# Patient Record
Sex: FEMALE | Race: WHITE | NOT HISPANIC OR LATINO | Employment: OTHER
[De-identification: names, ages, dates, MRNs, and addresses within clinical notes are randomized per-mention and may not be internally consistent; named-entity substitution may affect disease eponyms.]

---

## 2017-06-17 ENCOUNTER — HEALTH MAINTENANCE LETTER (OUTPATIENT)
Age: 67
End: 2017-06-17

## 2017-06-18 ENCOUNTER — MYC MEDICAL ADVICE (OUTPATIENT)
Dept: INTERNAL MEDICINE | Facility: CLINIC | Age: 67
End: 2017-06-18

## 2017-06-21 DIAGNOSIS — I10 ESSENTIAL HYPERTENSION: ICD-10-CM

## 2017-06-21 DIAGNOSIS — E78.5 HYPERLIPIDEMIA WITH TARGET LDL LESS THAN 130: ICD-10-CM

## 2017-06-21 PROCEDURE — 80061 LIPID PANEL: CPT | Performed by: INTERNAL MEDICINE

## 2017-06-21 PROCEDURE — 80053 COMPREHEN METABOLIC PANEL: CPT | Performed by: INTERNAL MEDICINE

## 2017-06-21 PROCEDURE — 36415 COLL VENOUS BLD VENIPUNCTURE: CPT | Performed by: INTERNAL MEDICINE

## 2017-06-22 LAB
ALBUMIN SERPL-MCNC: 3.8 G/DL (ref 3.4–5)
ALP SERPL-CCNC: 74 U/L (ref 40–150)
ALT SERPL W P-5'-P-CCNC: 32 U/L (ref 0–50)
ANION GAP SERPL CALCULATED.3IONS-SCNC: 10 MMOL/L (ref 3–14)
AST SERPL W P-5'-P-CCNC: 21 U/L (ref 0–45)
BILIRUB SERPL-MCNC: 0.8 MG/DL (ref 0.2–1.3)
BUN SERPL-MCNC: 17 MG/DL (ref 7–30)
CALCIUM SERPL-MCNC: 9.2 MG/DL (ref 8.5–10.1)
CHLORIDE SERPL-SCNC: 105 MMOL/L (ref 94–109)
CHOLEST SERPL-MCNC: 203 MG/DL
CO2 SERPL-SCNC: 26 MMOL/L (ref 20–32)
CREAT SERPL-MCNC: 0.73 MG/DL (ref 0.52–1.04)
GFR SERPL CREATININE-BSD FRML MDRD: 80 ML/MIN/1.7M2
GLUCOSE SERPL-MCNC: 94 MG/DL (ref 70–99)
HDLC SERPL-MCNC: 102 MG/DL
LDLC SERPL CALC-MCNC: 88 MG/DL
NONHDLC SERPL-MCNC: 101 MG/DL
POTASSIUM SERPL-SCNC: 4 MMOL/L (ref 3.4–5.3)
PROT SERPL-MCNC: 6.9 G/DL (ref 6.8–8.8)
SODIUM SERPL-SCNC: 141 MMOL/L (ref 133–144)
TRIGL SERPL-MCNC: 66 MG/DL

## 2017-06-26 ENCOUNTER — OFFICE VISIT (OUTPATIENT)
Dept: INTERNAL MEDICINE | Facility: CLINIC | Age: 67
End: 2017-06-26
Payer: COMMERCIAL

## 2017-06-26 VITALS
OXYGEN SATURATION: 98 % | HEART RATE: 75 BPM | HEIGHT: 65 IN | TEMPERATURE: 98 F | DIASTOLIC BLOOD PRESSURE: 66 MMHG | SYSTOLIC BLOOD PRESSURE: 120 MMHG | WEIGHT: 134 LBS | BODY MASS INDEX: 22.33 KG/M2

## 2017-06-26 DIAGNOSIS — F41.1 ANXIETY STATE: ICD-10-CM

## 2017-06-26 DIAGNOSIS — R91.8 LUNG NODULES: ICD-10-CM

## 2017-06-26 DIAGNOSIS — Z00.00 MEDICARE ANNUAL WELLNESS VISIT, INITIAL: Primary | ICD-10-CM

## 2017-06-26 DIAGNOSIS — I10 ESSENTIAL HYPERTENSION: ICD-10-CM

## 2017-06-26 DIAGNOSIS — E78.5 HYPERLIPIDEMIA WITH TARGET LDL LESS THAN 130: ICD-10-CM

## 2017-06-26 PROCEDURE — 99397 PER PM REEVAL EST PAT 65+ YR: CPT | Performed by: INTERNAL MEDICINE

## 2017-06-26 RX ORDER — PRAVASTATIN SODIUM 40 MG
40 TABLET ORAL DAILY
Qty: 90 TABLET | Refills: 3 | Status: SHIPPED | OUTPATIENT
Start: 2017-06-26 | End: 2018-05-11

## 2017-06-26 RX ORDER — LOSARTAN POTASSIUM AND HYDROCHLOROTHIAZIDE 12.5; 1 MG/1; MG/1
TABLET ORAL
Qty: 45 TABLET | Refills: 3 | Status: SHIPPED | OUTPATIENT
Start: 2017-06-26 | End: 2018-05-11

## 2017-06-26 RX ORDER — FLUOXETINE 20 MG/1
20 TABLET, FILM COATED ORAL EVERY MORNING
Qty: 90 TABLET | Refills: 3 | Status: SHIPPED | OUTPATIENT
Start: 2017-06-26 | End: 2018-05-11

## 2017-06-26 ASSESSMENT — ANXIETY QUESTIONNAIRES
1. FEELING NERVOUS, ANXIOUS, OR ON EDGE: NOT AT ALL
7. FEELING AFRAID AS IF SOMETHING AWFUL MIGHT HAPPEN: NOT AT ALL
6. BECOMING EASILY ANNOYED OR IRRITABLE: NOT AT ALL
IF YOU CHECKED OFF ANY PROBLEMS ON THIS QUESTIONNAIRE, HOW DIFFICULT HAVE THESE PROBLEMS MADE IT FOR YOU TO DO YOUR WORK, TAKE CARE OF THINGS AT HOME, OR GET ALONG WITH OTHER PEOPLE: NOT DIFFICULT AT ALL
GAD7 TOTAL SCORE: 0
2. NOT BEING ABLE TO STOP OR CONTROL WORRYING: NOT AT ALL
5. BEING SO RESTLESS THAT IT IS HARD TO SIT STILL: NOT AT ALL
3. WORRYING TOO MUCH ABOUT DIFFERENT THINGS: NOT AT ALL

## 2017-06-26 ASSESSMENT — PATIENT HEALTH QUESTIONNAIRE - PHQ9: 5. POOR APPETITE OR OVEREATING: NOT AT ALL

## 2017-06-26 NOTE — LETTER
[facility]  [address] [address]  [phone]        July 2, 2018    Oliva Kearney  [address] [facility] [address]              Dear Oliva Kearney    This is to remind you that your fasting labs is due.    You may call our office at [phone] to schedule an appointment.    Please disregard this notice if you have already had your labs drawn or made an appointment.        Sincerely,        Mitch Traylor MD

## 2017-06-26 NOTE — MR AVS SNAPSHOT
After Visit Summary   6/26/2017    Oliva Kearney    MRN: [id number]           Patient Information     Date Of Birth          1950        Visit Information        Provider Department      6/26/2017 1:00 PM Mitch Traylor MD [facility]        Today's Diagnoses     Medicare annual wellness visit, initial    -  1    Hyperlipidemia with target LDL less than 130        Essential hypertension        Anxiety state        Lung nodules          Care Instructions      Preventive Health Recommendations    Female Ages 65 +    Yearly exam:     See your health care provider every year in order to  o Review health changes.   o Discuss preventive care.    o Review your medicines if your doctor has prescribed any.      You no longer need a yearly Pap test unless you've had an abnormal Pap test in the past 10 years. If you have vaginal symptoms, such as bleeding or discharge, be sure to talk with your provider about a Pap test.      Every 1 to 2 years, have a mammogram.  If you are over 69, talk with your health care provider about whether or not you want to continue having screening mammograms.      Every 10 years, have a colonoscopy. Or, have a yearly FIT test (stool test). These exams will check for colon cancer.       Have a cholesterol test every 5 years, or more often if your doctor advises it.       Have a diabetes test (fasting glucose) every three years. If you are at risk for diabetes, you should have this test more often.       At age 65, have a bone density scan (DEXA) to check for osteoporosis (brittle bone disease).    Shots:    Get a flu shot each year.    Get a tetanus shot every 10 years.    Talk to your doctor about your pneumonia vaccines. There are now two you should receive - Pneumovax (PPSV 23) and Prevnar (PCV 13).    Talk to your doctor about the shingles vaccine.    Talk to your doctor about the hepatitis B vaccine.    Nutrition:     Eat at least 5 servings of  fruits and vegetables each day.      Eat whole-grain bread, whole-wheat pasta and brown rice instead of white grains and rice.      Talk to your provider about Calcium and Vitamin D.     Lifestyle    Exercise at least 150 minutes a week (30 minutes a day, 5 days a week). This will help you control your weight and prevent disease.      Limit alcohol to one drink per day.      No smoking.       Wear sunscreen to prevent skin cancer.       See your dentist twice a year for an exam and cleaning.      See your eye doctor every 1 to 2 years to screen for conditions such as glaucoma, macular degeneration and cataracts.          Follow-ups after your visit        Who to contact     If you have questions or need follow up information about today's clinic visit or your schedule please contact [facility] directly at [phone].  Normal or non-critical lab and imaging results will be communicated to you by [employer], letter or phone within 4 business days after the clinic has received the results. If you do not hear from us within 7 days, please contact the clinic through [employer] or phone. If you have a critical or abnormal lab result, we will notify you by phone as soon as possible.  Submit refill requests through [employer] or call your pharmacy and they will forward the refill request to us. Please allow 3 business days for your refill to be completed.          Additional Information About Your Visit        [employer] Information     [employer] gives you secure access to your electronic health record. If you see a primary care provider, you can also send messages to your care team and make appointments. If you have questions, please call your primary care clinic.  If you do not have a primary care provider, please call [phone] and they will assist you.        Care EveryWhere ID     This is your Care EveryWhere ID. This could be used by other organizations to access your [facility]  "records  [id number]        Your Vitals Were     Pulse Temperature Height Pulse Oximetry BMI (Body Mass Index)       75 98  F (36.7  C) (Oral) 5' 5\" (1.651 m) 98% 22.3 kg/m2        Blood Pressure from Last 3 Encounters:   06/26/17 120/66   11/17/16 118/68   09/06/16 108/80    Weight from Last 3 Encounters:   06/26/17 134 lb (60.8 kg)   11/17/16 138 lb (62.6 kg)   09/06/16 138 lb (62.6 kg)                 Where to get your medicines      These medications were sent to [id number] - [place], [place] - [address] AT [facility] [address] [facility] [address]  [address] [facility] [place], [facility] [address]     Phone:  [phone]     FLUoxetine 20 MG tablet    losartan-hydrochlorothiazide 100-12.5 MG per tablet    pravastatin 40 MG tablet          Primary Care Provider Office Phone # Fax #    Mitch Traylor [phone]       [facility] [address] [facility] [address]        Equal Access to Services     CHEO PAK AH: Hadii aury ku hadasho Soomaali, waaxda luqadaha, qaybta kaalmada adeegyada, trung reddyin hayfanin felipe parker. So [facility] [place] [phone].    ATENCIÓN: Si habla español, tiene a hauser disposición servicios gratuitos de asistencia lingüística. Llame al [phone].    We comply with applicable federal civil rights laws and [place] laws. We do not discriminate on the basis of race, color, national origin, age, disability sex, sexual orientation or gender identity.            Thank you!     Thank you for choosing [facility]  for your care. Our goal is always to provide you with excellent care. Hearing back from our patients is one way we can continue to improve our services. Please take a few minutes to complete the written survey that you may receive in the mail after your visit with us. Thank you!             Your Updated Medication List - Protect others around you: Learn how to safely use, store and throw away your medicines at " www.disposemymeds.org.          This list is accurate as of: 6/26/17 11:59 PM.  Always use your most recent med list.                   Brand Name Dispense Instructions for use Diagnosis    FLUoxetine 20 MG tablet     90 tablet    Take 1 tablet (20 mg) by mouth every morning    Anxiety state       losartan-hydrochlorothiazide 100-12.5 MG per tablet    HYZAAR    45 tablet    1/2 tablet daily    Essential hypertension       pravastatin 40 MG tablet    PRAVACHOL    90 tablet    Take 1 tablet (40 mg) by mouth daily    Hyperlipidemia with target LDL less than 130

## 2017-06-26 NOTE — PROGRESS NOTES
SUBJECTIVE:                                                            Oliva Kearney is a 67 year old female who presents for Preventive Visit.      Are you in the first 12 months of your Medicare Part B coverage?  No    Healthy Habits:  Answers for HPI/ROS submitted by the patient on 6/24/2017   Annual Exam:  Getting at least 3 servings of Calcium per day:: Yes  Bi-annual eye exam:: Yes  Dental care twice a year:: Yes  Sleep apnea or symptoms of sleep apnea:: None  Diet:: Carbohydrate counting  Frequency of exercise:: 4-5 days/week  Taking medications regularly:: Yes  Medication side effects:: None  Additional concerns today:: No  PHQ-2 Score: 0  Duration of exercise:: 30-45 minutes      COGNITIVE SCREEN  1) Repeat 3 items (Banana, Sunrise, Chair)    2) Clock draw: NORMAL  3) 3 item recall: Recalls 3 objects  Results: NORMAL clock, 1-2 items recalled: COGNITIVE IMPAIRMENT LESS LIKELY    Mini-CogTM Copyright ARMEN Mckay. Licensed by the author for use in [facility]; reprinted with permission (soob@[email or web address]). All rights reserved.                Reviewed and updated as needed this visit by clinical staff  Tobacco  Allergies         Reviewed and updated as needed this visit by Provider        Social History   Substance Use Topics     Smoking status: Former Smoker     Packs/day: 0.25     Years: 10.00     Types: Cigarettes     Quit date: 10/26/1969     Smokeless tobacco: Not on file      Comment: light cigarette use     Alcohol use Yes      Comment: 1 glass wine per day max       The patient does not drink >3 drinks per day nor >7 drinks per week.    Today's PHQ-2 Score:   PHQ-2 ( 1999 Pfizer) 6/26/2017 6/24/2017   Q1: Little interest or pleasure in doing things 0 0   Q2: Feeling down, depressed or hopeless 0 0   PHQ-2 Score 0 0   Q1: Little interest or pleasure in doing things - Not at all   Q2: Feeling down, depressed or hopeless - Not at all   PHQ-2 Score - 0       Do you feel safe in your  environment - Yes    Do you have a Health Care Directive?: Yes: Patient states has Advance Directive and will bring in a copy to clinic.    Current providers sharing in care for this patient include:   Patient Care Team:  Mitch Traylor MD as PCP - General      Hearing impairment: No    Ability to successfully perform activities of daily living: Yes, no assistance needed     Fall risk:  Fallen 2 or more times in the past year?: No  Any fall with injury in the past year?: No    Home safety:  none identified      The following health maintenance items are reviewed in Epic and correct as of today:  Health Maintenance   Topic Date Due     PAP Q3 YR  12/10/2015     ADVANCE DIRECTIVE PLANNING Q5 YRS  12/05/2016     INFLUENZA VACCINE (SYSTEM ASSIGNED)  09/01/2017     FALL RISK ASSESSMENT  11/17/2017     MAMMO SCREEN Q2 YR (SYSTEM ASSIGNED)  12/19/2018     COLONOSCOPY Q5 YR  04/16/2019     LIPID SCREEN Q5 YR FEMALE (SYSTEM ASSIGNED)  06/21/2022     TETANUS IMMUNIZATION (SYSTEM ASSIGNED)  05/11/2026     DEXA SCAN SCREENING (SYSTEM ASSIGNED)  Completed     PNEUMOCOCCAL  Completed     HEPATITIS C SCREENING  Completed              ROS:  C: NEGATIVE for fever, chills. Weight down 4 pounds with diet/ activity  I: NEGATIVE for worrisome rashes, moles or lesions. Sees Dermatology annually with last exam 2 weeks ago  E: NEGATIVE for vision changes or irritation. Eye exam Fall 2016  E/M: NEGATIVE for ear, mouth and throat problems  R: NEGATIVE for significant cough or SOB. Hx tiny nodules that needs repeat CT in Sept 2017  B: NEGATIVE for masses, tenderness or discharge  CV: NEGATIVE for chest pain, palpitations or peripheral edema  GI: NEGATIVE for nausea, abdominal pain, heartburn, or change in bowel habits  : NEGATIVE for frequency, dysuria, or hematuria  M: NEGATIVE for significant arthralgias or myalgia that limits usual activities overall. Hx osteoarthritis   N: NEGATIVE for weakness, dizziness or paresthesias  E:  POSITIVE  "for  Lipid therapy with statin  H: NEGATIVE for bleeding problems  P: NEGATIVE for changes in mood or affect with med.MARCOS = 0    Problem list, Medication list, Allergies, and Medical/Social/Surgical histories reviewed in [facility] and updated as appropriate.  OBJECTIVE:                                                            /66  Pulse 75  Temp 98  F (36.7  C) (Oral)  Ht 5' 5\" (1.651 m)  Wt 134 lb (60.8 kg)  SpO2 98%  BMI 22.3 kg/m2 Estimated body mass index is 22.3 kg/(m^2) as calculated from the following:    Height as of this encounter: 5' 5\" (1.651 m).    Weight as of this encounter: 134 lb (60.8 kg).  EXAM:   General appearance - healthy, alert, no distress  Skin - No rashes or lesions.  Head - normocephalic, atraumatic  Eyes - MANDO, EOMI, fundi exam with nondilated pupils negative.  Ears - External ears normal. Canals clear. TM's normal.  Nose/Sinuses - Nares normal. Septum midline. Mucosa normal. No drainage or sinus tenderness.  Oropharynx - No erythema, no adenopathy, no exudates.  Neck - Supple without adenopathy or thyromegaly. No bruits.  Lungs - Clear to auscultation without wheezes/rhonchi.  Heart - Regular rate and rhythm without murmurs, clicks, or gallops.  Nodes - No supraclavicular, axillary, or inguinal adenopathy palpable.  Breasts - deferred  Abdomen - Abdomen soft, non-tender. BS normal. No masses or hepatosplenomegaly palpable. No bruits.  Extremities -No cyanosis, clubbing or edema.    Musculoskeletal - Spine ROM normal. Muscular strength intact.  DIP and PIP joints hands with osteoarthritis changes (chronic)  Peripheral pulses - radial=4/4, femoral=4/4, posterior tibial=4/4, dorsalis pedis=4/4,  Neuro - Gait normal. Reflexes normal and symmetric. Sensation grossly WNL.  Genital/Rectal - deferred      ASSESSMENT / PLAN:                                                            1. Medicare annual wellness visit, initial  HCM UTD    2. Hyperlipidemia with target LDL less than 130   " "controlled. Continue current medications. Future labs as ordered  - pravastatin (PRAVACHOL) 40 MG tablet; Take 1 tablet (40 mg) by mouth daily  Dispense: 90 tablet; Refill: 3  - Lipid panel reflex to direct LDL; Future  - Comprehensive metabolic panel; Future    3. Essential hypertension   controlled. Continue current medications. Future labs as ordered  - losartan-hydrochlorothiazide (HYZAAR) 100-12.5 MG per tablet; 1/2 tablet daily  Dispense: 45 tablet; Refill: 3  - Lipid panel reflex to direct LDL; Future  - Comprehensive metabolic panel; Future    4. Anxiety state   controlled. Continue current medications.   - FLUoxetine 20 MG tablet; Take 1 tablet (20 mg) by mouth every morning  Dispense: 90 tablet; Refill: 3    5. Lung nodules  Stable. Repeat CT chest in Septemner      End of Life Planning:  Patient currently has an advanced directive: Yes.  Practitioner is supportive of decision.    COUNSELING:  Reviewed preventive health counseling, as reflected in patient instructions        Estimated body mass index is 22.3 kg/(m^2) as calculated from the following:    Height as of this encounter: 5' 5\" (1.651 m).    Weight as of this encounter: 134 lb (60.8 kg).  Weight management plan noted, stable and monitoring   reports that she quit smoking about 47 years ago. Her smoking use included Cigarettes. She has a 2.50 pack-year smoking history. She does not have any smokeless tobacco history on file.      Appropriate preventive services were discussed with this patient, including applicable screening as appropriate for cardiovascular disease, diabetes, osteopenia/osteoporosis, and glaucoma.  As appropriate for age/gender, discussed screening for colorectal cancer, prostate cancer, breast cancer, and cervical cancer. Checklist reviewing preventive services available has been given to the patient.    Reviewed patients plan of care and provided an AVS. The Basic Care Plan (routine screening as documented in Health " Maintenance) for Oliva meets the Care Plan requirement. This Care Plan has been established and reviewed with the Patient.    Counseling Resources:  ATP IV Guidelines  Pooled Cohorts Equation Calculator  Breast Cancer Risk Calculator  FRAX Risk Assessment  ICSI Preventive Guidelines  Dietary Guidelines for Americans, 2010  USDA's MyPlate  ASA Prophylaxis  Lung CA Screening    PLAN:  Continue current meds  Prescriptions refilled.    Call  [phone] or use [employer] to schedule a future lab appointment  fasting in 1 year.   CT chest mid September       Mitch Traylor MD  [facility]

## 2017-06-26 NOTE — NURSING NOTE
"Chief Complaint   Patient presents with     Physical       Initial /66  Pulse 75  Temp 98  F (36.7  C) (Oral)  Ht 5' 5\" (1.651 m)  Wt 134 lb (60.8 kg)  SpO2 98%  BMI 22.3 kg/m2 Estimated body mass index is 22.3 kg/(m^2) as calculated from the following:    Height as of this encounter: 5' 5\" (1.651 m).    Weight as of this encounter: 134 lb (60.8 kg).  Medication Reconciliation: complete  "

## 2017-06-27 ASSESSMENT — ANXIETY QUESTIONNAIRES: GAD7 TOTAL SCORE: 0

## 2017-08-18 ENCOUNTER — MYC MEDICAL ADVICE (OUTPATIENT)
Dept: INTERNAL MEDICINE | Facility: CLINIC | Age: 67
End: 2017-08-18

## 2017-08-18 DIAGNOSIS — R91.8 LUNG NODULES: Primary | ICD-10-CM

## 2017-09-05 ENCOUNTER — HOSPITAL ENCOUNTER (OUTPATIENT)
Dept: CT IMAGING | Facility: CLINIC | Age: 67
Discharge: HOME | End: 2017-09-05
Attending: INTERNAL MEDICINE | Admitting: INTERNAL MEDICINE
Payer: MEDICARE

## 2017-09-05 DIAGNOSIS — R91.8 LUNG NODULES: ICD-10-CM

## 2017-09-05 PROCEDURE — 71250 CT THORAX DX C-: CPT

## 2018-01-02 ENCOUNTER — TRANSFERRED RECORDS (OUTPATIENT)
Dept: HEALTH INFORMATION MANAGEMENT | Facility: CLINIC | Age: 68
End: 2018-01-02

## 2018-01-29 ENCOUNTER — TRANSFERRED RECORDS (OUTPATIENT)
Dept: HEALTH INFORMATION MANAGEMENT | Facility: CLINIC | Age: 68
End: 2018-01-29

## 2018-03-22 ENCOUNTER — MYC MEDICAL ADVICE (OUTPATIENT)
Dept: INTERNAL MEDICINE | Facility: CLINIC | Age: 68
End: 2018-03-22

## 2018-03-23 ENCOUNTER — TELEPHONE (OUTPATIENT)
Dept: INTERNAL MEDICINE | Facility: CLINIC | Age: 68
End: 2018-03-23

## 2018-03-23 NOTE — TELEPHONE ENCOUNTER
Per your mychart conversation on 3- Jessie is requesting a referral to [facility] in [place] due to dizziness. The address is [address] [place] [address]. Phone# [phone] if questions and Fax #[phone].    Please place the order/referral and fax   Talked with Jessie and told her to check benefit level for out of state visit.

## 2018-03-23 NOTE — TELEPHONE ENCOUNTER
I cannot refer pt to a [facility] center as I have not seen her for any of these sx she discussed in [employer] message she sent and have no records and will not be available to review any records since not in clinic again until 4/2/18. Pt said in note that she was evaluated by a neurologist. Will have to get referral from neuro or be seen back  here in [place] for eval

## 2018-04-04 ENCOUNTER — TRANSFERRED RECORDS (OUTPATIENT)
Dept: HEALTH INFORMATION MANAGEMENT | Facility: CLINIC | Age: 68
End: 2018-04-04

## 2018-04-11 ENCOUNTER — TRANSFERRED RECORDS (OUTPATIENT)
Dept: HEALTH INFORMATION MANAGEMENT | Facility: CLINIC | Age: 68
End: 2018-04-11

## 2018-04-11 LAB
CHOLEST SERPL-MCNC: 213 MG/DL
CREAT SERPL-MCNC: 0.68 MG/DL (ref 0.5–0.99)
GFR SERPL CREATININE-BSD FRML MDRD: 90 ML/MIN/1.73M2
GLUCOSE SERPL-MCNC: 89 MG/DL (ref 65–99)
HDLC SERPL-MCNC: 100 MG/DL
POTASSIUM SERPL-SCNC: 4 MMOL/L (ref 3.5–5.3)
TRIGL SERPL-MCNC: 65 MG/DL
TSH SERPL-ACNC: 2.46 MIU/L (ref 0.4–4.5)

## 2018-05-11 ENCOUNTER — OFFICE VISIT (OUTPATIENT)
Dept: INTERNAL MEDICINE | Facility: CLINIC | Age: 68
End: 2018-05-11
Payer: COMMERCIAL

## 2018-05-11 VITALS
HEIGHT: 64 IN | TEMPERATURE: 98.3 F | OXYGEN SATURATION: 99 % | SYSTOLIC BLOOD PRESSURE: 144 MMHG | HEART RATE: 98 BPM | RESPIRATION RATE: 18 BRPM | WEIGHT: 134.3 LBS | BODY MASS INDEX: 22.93 KG/M2 | DIASTOLIC BLOOD PRESSURE: 76 MMHG

## 2018-05-11 DIAGNOSIS — F41.1 ANXIETY STATE: ICD-10-CM

## 2018-05-11 DIAGNOSIS — E78.5 HYPERLIPIDEMIA WITH TARGET LDL LESS THAN 130: ICD-10-CM

## 2018-05-11 DIAGNOSIS — E04.1 THYROID NODULE: ICD-10-CM

## 2018-05-11 DIAGNOSIS — I10 ESSENTIAL HYPERTENSION: ICD-10-CM

## 2018-05-11 DIAGNOSIS — R42 DIZZINESS: ICD-10-CM

## 2018-05-11 PROCEDURE — 99214 OFFICE O/P EST MOD 30 MIN: CPT | Performed by: INTERNAL MEDICINE

## 2018-05-11 NOTE — PROGRESS NOTES
"  SUBJECTIVE:   Oliva Kearney is a 67 year old female who presents to clinic today for the following health issues:      Dizziness      Duration: 4-5 months     Description   Feeling faint:  YES  Feeling like the surroundings are moving: no   Loss of consciousness or falls: no     Intensity:  moderate    Accompanying signs and symptoms:   Nausea/vomitting: YES- nausea  Palpitations: no   Weakness in arms or legs: no   Vision or speech changes: no   Ringing in ears (Tinnitus): no   Hearing loss related to dizziness: no   Other (fevers/chills/sweating/dyspnea): no     History (similar episodes/head trauma/previous evaluation/recent bleeding): None    Precipitating or alleviating factors (new meds/chemicals): None  Worse with activity/head movement: YES    Therapies tried and outcome: Resting, increased the Hyzaar to full tab-helped     Pt's past medical history, family history, habits, medications and allergies were reviewed with the patient today.  See snap shot for  HCM status. Most recent lab results reviewed with pt. Problem list and histories reviewed & adjusted, as indicated.  Additional history as below:    Pt sent [employer] message to me in March as below re: sx and work-up out of state:    Dr Traylor   I am currently out of state til May 1.  Before I left [place]- around [place]- I had an incident of being very off balance when first getting out of bed.  It went away but have been experiencing the same \"unbalance\" a few times a week & not going away sometimes for several hrs.  On 1/2 I went to a health center- there they did blood draw & CT head scan without contrast.  Saw no sign of stroke or brain problem- just \"age-related changes\"  & \"no acute intracranial abnormality\".  They rec & referred me to a neurologist whom I saw on 1/29,  He ordered a Bilateral Carotid Ultrasound- all in normal range (a note made of \"multiple cystic areas in the right lobe of thyroid gland compatible with goiter\")   I continue to " "experience dizziness (not the room-spinning \" type)- more of an unbalance.  I am assuming whatever the cause its not life-threatening & I made an apptmt to see you 5/7.  I am wondering though (as today symptoms continued for several hrs)- if you think there might be value in seeing an ear specialist as I've read that usually such problems are ear-related-   I realize that you can t diagnose from afar, have tried to be proactive, but have no answers at this point, so just wondering if you have any recommendations   Thank you   Lesley Kearney      Patient brings in some results of labs and imaging done in [place] as discussed above.   CT head 1/2/18 neg except for  \"age related changes\"   CUS 1/29/18 =  0-29% bilateral carotids. Had incidental multiple cystic areas right thyroid lobe  MRI brain: Moderate periventricular, subcortical white matter and pontine microangiopathic. No infarcts. No sinus issues   Noral TSH, lipids, CMP and lipids    No falls   Sx overall much better   Stress will occ bring it on.   No palpitations  Vision normal.   Mild anxiety recently with  having elevated LFTs from alcohol use (he is being seen in clinic today also at separate appt). Otherwise mood OK with meds. Denies depression      Additional ROS:   Constitutional, HEENT, Cardiovascular, Pulmonary, GI and , Neuro, MSK and Psych review of systems/symptoms are otherwise negative or unchanged from previous, except as noted above.      OBJECTIVE:  /76  Pulse 98  Temp 98.3  F (36.8  C) (Oral)  Resp 18  Ht 5' 4\" (1.626 m)  Wt 134 lb 4.8 oz (60.9 kg)  SpO2 99%  BMI 23.05 kg/m2   Estimated body mass index is 23.05 kg/(m^2) as calculated from the following:    Height as of this encounter: 5' 4\" (1.626 m).    Weight as of this encounter: 134 lb 4.8 oz (60.9 kg).  Eye: PERRL, EOMI  HENT: ear canals and TM's normal and nose and mouth without ulcers or lesions   Neck: no adenopathy. Thyroid nodule right.  No bruits  Pulm: " Lungs clear to auscultation   CV: Regular rates and rhythm. No orthostatic BP change with lying to standing  GI: Soft, nontender, Normal active bowel sounds, No hepatosplenomegaly or masses palpable  Ext: Peripheral pulses intact. No edema.  Neuro: Normal strength and tone, sensory exam grossly normal.  Negative Romberg.  Normal finger-nose-finger.  Normal heel to toe gait.  Negative Hallpike bilaterally    Assessment/Plan: (See plan discussion below for further details)  1. Dizziness  No orthostasis.  Appears related to BPPV that has resolved. See plan discussion below.     2. Essential hypertension  Blood pressure mildly elevated though some stress today discussing patient's 's health issues.  Continue current medication.  Given findings on MRI and cardiovascular risk factors, will add aspirin 81 mg daily.  Patient will update me in 1 month and Mychart regarding blood pressure status.  If still consistently greater than 130/80, will add amlodipine  - losartan-hydrochlorothiazide (HYZAAR) 100-12.5 MG per tablet; Take 1 tablet by mouth daily  Dispense: 90 tablet; Refill: 3  - aspirin 81 MG tablet; Take 1 tablet (81 mg) by mouth daily  Dispense: 30 tablet; Refill: 11    3. Anxiety state  Stable overall.  Recent mild stressors as above.  Continue fluoxetine at current dose  - FLUoxetine 20 MG tablet; Take 1 tablet (20 mg) by mouth every morning  Dispense: 90 tablet; Refill: 3    4. Hyperlipidemia with target LDL less than 130  Recent lipids done in [place] normal.  Results will be scanned in the chart.  Continue pravastatin  - pravastatin (PRAVACHOL) 40 MG tablet; Take 1 tablet (40 mg) by mouth daily  Dispense: 90 tablet; Refill: 3    5. Thyroid nodule  Thyroid ultrasound to follow-up on ? Cystic  changes seen on carotid ultrasound in [place]  -  Thyroid; Future      Plan discussion:   Aspirin 81mg daily   If develop any heart palp[itations, then let me know and will get Ziopatch 14 day monitor  If recurrence  dizziness, will refer to  Balance  Update me with BP 1 month if generally < 130/80 or do evisit/clinic visit if higher  Continue current medications.  Refills done  Thyroid ultrasound - [facility] will call to schedule       Mitch Traylor MD  Internal Medicine Department  [facility]

## 2018-05-11 NOTE — MR AVS SNAPSHOT
After Visit Summary   5/11/2018    Oliva Kearney    MRN: [id number]           Patient Information     Date Of Birth          1950        Visit Information        Provider Department      5/11/2018 9:30 AM Mitch Traylor MD [facility]        Today's Diagnoses     Essential hypertension        Anxiety state        Hyperlipidemia with target LDL less than 130          Care Instructions     Aspirin 81mg daily   If heart palpitations, then let me know and will get Ziopatch 14 day monitor  If recurrence dizziness, will refer to  Balance  Update me with BP 1 month if generally < 135/80 or do evisit/clinic visit if higher          Follow-ups after your visit        Your next 10 appointments already scheduled     Jun 29, 2018 10:00 AM CDT   PHYSICAL with Mitch Traylor MD   [facility] ([facility])    [address] [place] [address] [phone]              Who to contact     If you have questions or need follow up information about today's clinic visit or your schedule please contact [facility] directly at [phone].  Normal or non-critical lab and imaging results will be communicated to you by MyChart, letter or phone within 4 business days after the clinic has received the results. If you do not hear from us within 7 days, please contact the clinic through [employer] or phone. If you have a critical or abnormal lab result, we will notify you by phone as soon as possible.  Submit refill requests through [employer] or call your pharmacy and they will forward the refill request to us. Please allow 3 business days for your refill to be completed.          Additional Information About Your Visit        MyChart Information     [employer] gives you secure access to your electronic health record. If you see a primary care provider, you can also send messages to your care team and make  "appointments. If you have questions, please call your primary care clinic.  If you do not have a primary care provider, please call [phone] and they will assist you.        Care EveryWhere ID     This is your Care EveryWhere ID. This could be used by other organizations to access your [facility] medical records  [id number]        Your Vitals Were     Pulse Temperature Respirations Height Pulse Oximetry BMI (Body Mass Index)    98 98.3  F (36.8  C) (Oral) 18 5' 4\" (1.626 m) 99% 23.05 kg/m2       Blood Pressure from Last 3 Encounters:   05/11/18 144/76   06/26/17 120/66   11/17/16 118/68    Weight from Last 3 Encounters:   05/11/18 134 lb 4.8 oz (60.9 kg)   06/26/17 134 lb (60.8 kg)   11/17/16 138 lb (62.6 kg)              Today, you had the following     No orders found for display         Today's Medication Changes          These changes are accurate as of 5/11/18 10:26 AM.  If you have any questions, ask your nurse or doctor.               These medicines have changed or have updated prescriptions.        Dose/Directions    losartan-hydrochlorothiazide 100-12.5 MG per tablet   Commonly known as:  HYZAAR   This may have changed:    - how much to take  - additional instructions   Used for:  Essential hypertension        1/2 tablet daily   Quantity:  45 tablet   Refills:  3                Primary Care Provider Office Phone # Fax #    Mitch Traylor [phone]       [address] [facility] [address]        Equal Access to Services     CHRISTINAO PAK AH: Hadii aad ku hadasho Soomaali, waaxda luqadaha, qaybta kaalmada adeegyada, trung parker. So [facility] [phone].    ATENCIÓN: Si habla español, tiene a hauser disposición servicios gratuitos de asistencia lingüística. Llame al [phone].    We comply with applicable federal civil rights laws and [place] laws. We do not discriminate on the basis of race, color, national origin, age, disability, sex, sexual orientation, or " gender identity.            Thank you!     Thank you for choosing [facility]  for your care. Our goal is always to provide you with excellent care. Hearing back from our patients is one way we can continue to improve our services. Please take a few minutes to complete the written survey that you may receive in the mail after your visit with us. Thank you!             Your Updated Medication List - Protect others around you: Learn how to safely use, store and throw away your medicines at www.disposemymeds.org.          This list is accurate as of 5/11/18 10:26 AM.  Always use your most recent med list.                   Brand Name Dispense Instructions for use Diagnosis    FLUoxetine 20 MG tablet     90 tablet    Take 1 tablet (20 mg) by mouth every morning    Anxiety state       losartan-hydrochlorothiazide 100-12.5 MG per tablet    HYZAAR    45 tablet    1/2 tablet daily    Essential hypertension       pravastatin 40 MG tablet    PRAVACHOL    90 tablet    Take 1 tablet (40 mg) by mouth daily    Hyperlipidemia with target LDL less than 130

## 2018-05-11 NOTE — PATIENT INSTRUCTIONS
Aspirin 81mg daily   If develop any heart palp[itations, then let me know and will get Ziopatch 14 day monitor  If recurrence dizziness, will refer to [facility]  Update me with BP 1 month if generally < 130/80 or do evisit/clinic visit if higher  Continue current medications.  Refills done  Thyroid ultrasound - SD will call to schedule

## 2018-05-12 RX ORDER — PRAVASTATIN SODIUM 40 MG
40 TABLET ORAL DAILY
Qty: 90 TABLET | Refills: 3 | Status: SHIPPED | OUTPATIENT
Start: 2018-05-12 | End: 2019-01-28

## 2018-05-12 RX ORDER — LOSARTAN POTASSIUM AND HYDROCHLOROTHIAZIDE 12.5; 1 MG/1; MG/1
1 TABLET ORAL DAILY
Qty: 90 TABLET | Refills: 3 | Status: SHIPPED | OUTPATIENT
Start: 2018-05-12 | End: 2019-01-28

## 2018-05-12 RX ORDER — FLUOXETINE 20 MG/1
20 TABLET, FILM COATED ORAL EVERY MORNING
Qty: 90 TABLET | Refills: 3 | Status: SHIPPED | OUTPATIENT
Start: 2018-05-12 | End: 2019-01-28

## 2018-05-13 ENCOUNTER — MYC MEDICAL ADVICE (OUTPATIENT)
Dept: INTERNAL MEDICINE | Facility: CLINIC | Age: 68
End: 2018-05-13

## 2018-05-16 ENCOUNTER — TELEPHONE (OUTPATIENT)
Dept: INTERNAL MEDICINE | Facility: CLINIC | Age: 68
End: 2018-05-16

## 2018-05-16 ENCOUNTER — HOSPITAL ENCOUNTER (OUTPATIENT)
Dept: ULTRASOUND IMAGING | Facility: CLINIC | Age: 68
Discharge: HOME | End: 2018-05-16
Attending: INTERNAL MEDICINE | Admitting: INTERNAL MEDICINE
Payer: MEDICARE

## 2018-05-16 DIAGNOSIS — E04.1 THYROID NODULE: ICD-10-CM

## 2018-05-16 PROCEDURE — 76536 US EXAM OF HEAD AND NECK: CPT

## 2018-05-16 NOTE — TELEPHONE ENCOUNTER
Prior Authorization Retail Medication Request    Medication/Dose: fluoxetine 20mg tablets  ICD code (if different than what is on RX):  F41.1  Previously Tried and Failed:    Rationale:      Insurance Name:  [employer] [phone]  Insurance ID:  [id number]      Pharmacy Information (if different than what is on RX)  Name:  [facility] [address] [facility] [place] [address]  Phone:  [phone]

## 2018-05-17 NOTE — TELEPHONE ENCOUNTER
Central Prior Authorization Team   Phone: [phone]    PA Initiation    Medication: fluoxetine 20mg tablets  Insurance Company: [employer] - Phone [phone] Fax [phone]  Pharmacy Filling the Rx: [email or web address] [address] [place] [address] - [address] AT [facility] OF  [address]  Filling Pharmacy Phone: [phone]  Filling Pharmacy Fax:    Start Date: 5/17/2018

## 2018-05-17 NOTE — TELEPHONE ENCOUNTER
Prior Authorization Approval    Authorization Effective Date: 2/16/2018  Authorization Expiration Date: 5/17/2019  Medication: fluoxetine 20mg tablets  Approved Dose/Quantity:    Reference #:     Insurance Company: Massiel Castillo - Phone [phone] Fax [phone]  Expected CoPay:       CoPay Card Available:      Foundation Assistance Needed:    Which Pharmacy is filling the prescription (Not needed for infusion/clinic administered): [facility] [id number] [place] [address] [address] AT [address]  Pharmacy Notified: Yes  Patient Notified: Yes

## 2018-05-23 ENCOUNTER — MYC MEDICAL ADVICE (OUTPATIENT)
Dept: INTERNAL MEDICINE | Facility: CLINIC | Age: 68
End: 2018-05-23

## 2018-05-23 DIAGNOSIS — E04.2 MULTINODULAR GOITER: Primary | ICD-10-CM

## 2018-05-24 RX ORDER — LIDOCAINE 40 MG/G
CREAM TOPICAL
Status: CANCELLED | OUTPATIENT
Start: 2018-05-24

## 2018-05-25 ENCOUNTER — TELEPHONE (OUTPATIENT)
Dept: INTERVENTIONAL RADIOLOGY/VASCULAR | Facility: CLINIC | Age: 68
End: 2018-05-25

## 2018-05-25 NOTE — TELEPHONE ENCOUNTER
Interventional Radiology   Interventional Radiology at [facility] has been requested to perform a Right thyroid nodule (one vs 3) from Dr Traylor.  Oliva Kearney is a 67 year old woman with a PMH hyperlipidemia, hypertension, osteoarthritis, low back pain, recent dizzy spells while during work up had a carotid US done in [place] with an incidental finding of thyroid nodules on the right.  During work up she had a thyroid ultrasound done on 5/16/18. Results were:  IMPRESSION:   1. Multinodular goiter involving the right lobe versus one very large nodule with multiple cystic and solid components. If this is one nodule, it measures about 4.3 x 3.2 cm diameter.  2. Left lobe appears normal.    A thyroid FNA was requested for further evaluation.     Central scheduling has contacted the patient and scheduled the FNA for Tuesday 5/29/18.     [facility] Interventional Radiology CNP ([phone])

## 2018-05-29 ENCOUNTER — HOSPITAL ENCOUNTER (OUTPATIENT)
Facility: CLINIC | Age: 68
Discharge: HOME | End: 2018-05-29
Attending: INTERNAL MEDICINE | Admitting: INTERNAL MEDICINE
Payer: MEDICARE

## 2018-05-29 ENCOUNTER — HOSPITAL ENCOUNTER (OUTPATIENT)
Dept: ULTRASOUND IMAGING | Facility: CLINIC | Age: 68
End: 2018-05-29
Attending: INTERNAL MEDICINE | Admitting: INTERNAL MEDICINE
Payer: MEDICARE

## 2018-05-29 VITALS
TEMPERATURE: 98.4 F | DIASTOLIC BLOOD PRESSURE: 72 MMHG | RESPIRATION RATE: 16 BRPM | OXYGEN SATURATION: 98 % | SYSTOLIC BLOOD PRESSURE: 155 MMHG | HEART RATE: 56 BPM

## 2018-05-29 DIAGNOSIS — E04.2 MULTINODULAR GOITER: ICD-10-CM

## 2018-05-29 PROCEDURE — 88173 CYTOPATH EVAL FNA REPORT: CPT | Performed by: INTERNAL MEDICINE

## 2018-05-29 PROCEDURE — 76942 ECHO GUIDE FOR BIOPSY: CPT

## 2018-05-29 PROCEDURE — 88173 CYTOPATH EVAL FNA REPORT: CPT | Mod: 26 | Performed by: INTERNAL MEDICINE

## 2018-05-29 PROCEDURE — 40000863 ZZH STATISTIC RADIOLOGY XRAY, US, CT, MAR, NM

## 2018-05-29 NOTE — DISCHARGE INSTRUCTIONS
Thyroid Biopsy Discharge Instructions     After you go home:      You may resume your normal diet    Care of Puncture Site:      You may have mild bruising, soreness & swelling at the puncture site. This will go away in a few days    For swelling & bruising, you may use an ice pack on the site.     Activity:      You may go back to your normal activity    Avoid strenuous activity for 24 hours    Medicines:      You may resume all medications    For minor pain, you may take Acetaminophen (Tylenol) or Ibuprofen (Advil)            Call the provider who ordered this test if:      Increased redness or swelling at the site    Fluid or blood oozing from the site    Severe pain at the site    Chills or a fever greater than 101 F (38 C).    Any questions or concerns    Call  911 or go to the Emergency Room if:      Trouble breathing    Your neck swells    Bleeding that you cannot control    Severe difficulty swallowing    If you have questions call:      [facility] Radiology Dept @ [phone]    The provider who performed your procedure was _________________.

## 2018-05-29 NOTE — PROGRESS NOTES
RADIOLOGY PROCEDURE NOTE  Patient name: Oliva Kearney  MRN: [id number]  : 1950    Pre-procedure diagnosis: Thyroid nodule  Post-procedure diagnosis: Same    Procedure Date/Time: May 29, 2018  9:19 AM  Procedure: Right thyroid nodule biopsy  Estimated blood loss: None  Specimen(s) collected with description: 3 FNA samples onto slides  The patient tolerated the procedure well with no immediate complications.  Significant findings:none    See imaging dictation for procedural details.    Provider name: Reese Connors  Assistant(s):None

## 2018-05-29 NOTE — IP AVS SNAPSHOT
MRN:[id number]                      After Visit Summary   5/29/2018    Oliva Kearney    MRN: [id number]           Visit Information        Department      5/29/2018  8:10 AM [facility]          Review of your medicines      UNREVIEWED medicines. Ask your doctor about these medicines        Dose / Directions    aspirin 81 MG tablet   Used for:  Essential hypertension        Dose:  81 mg   Take 1 tablet (81 mg) by mouth daily   Quantity:  30 tablet   Refills:  11       FLUoxetine 20 MG tablet   Used for:  Anxiety state        Dose:  20 mg   Take 1 tablet (20 mg) by mouth every morning   Quantity:  90 tablet   Refills:  3       losartan-hydrochlorothiazide 100-12.5 MG per tablet   Commonly known as:  HYZAAR   Used for:  Essential hypertension        Dose:  1 tablet   Take 1 tablet by mouth daily   Quantity:  90 tablet   Refills:  3       pravastatin 40 MG tablet   Commonly known as:  PRAVACHOL   Used for:  Hyperlipidemia with target LDL less than 130        Dose:  40 mg   Take 1 tablet (40 mg) by mouth daily   Quantity:  90 tablet   Refills:  3                Protect others around you: Learn how to safely use, store and throw away your medicines at www.disposemymeds.org.         Follow-ups after your visit        Your next 10 appointments already scheduled     May 29, 2018  8:30 AM CDT   US BIOPSY THYROID FINE NEEDLE ASPIRATION with SHUS4, SH BODY RAD   [facility] ([facility])    [address] [place] [address] [phone]           Bring a list of your medicines to the exam. Include vitamins, minerals and over-the-counter drugs.  Tell your doctor in advance:   If you are or may be pregnant.   If you are taking Coumadin (or any other blood thinners) 5 days prior to the exam for any special instructions.  IF YOUR DOCTOR HAS TOLD YOU THAT YOU WILL BE RECEIVING SEDATION (medicine to help you relax): (Typically sedation is  "only for liver exams at [facility] and Renal Biopsy exams in Pediatrics)   See your family doctor for an exam within 30 days of treatment.   Plan for an adult to drive you home and stay with you for at least 24 hours.   No eating or drinking for 4 hours before your test. You may take medicine with small sips of water.   If you have diabetes:If you take insulin, call your diabetes care team for any special instructions for this exam.  Please call the Imaging Department at your exam site with any questions.             Jun 04, 2018  1:00 PM CDT   MA SCREEN IMPLANTS BILATERAL W/ DANNA with SHBCMA4   [facility] ([facility])    [address] [place] [address], [address]  [facility] [address] [phone]           Do not use any powder, lotion or deodorant under your arms or on your breast. If you do, we will ask you to remove it before your exam.  Wear comfortable, two-piece clothing.  If you have any allergies, tell your care team.  Bring any previous mammograms from other facilities or have them mailed to the breast center.  Three-dimensional (3D) mammograms are available at [facility] locations in [facility], [place], [place], [facility], [place], [place], and [place]. [facility] locations include [place] and [facility] in [place]. Benefits of 3D mammograms include: - Improved rate of cancer detection - Decreases your chance of having to go back for more tests, which means fewer: - \"False-positive\" results (This means that there is an abnormal area but it isn't cancer.) - Invasive testing procedures, such as a biopsy or surgery - Can provide clearer images of the breast if you have dense breast tissue. 3D mammography is an optional exam that anyone can have with a 2D mammogram. It doesn't replace or take the place of a 2D mammogram. 2D mammograms remain an effective screening test for all women.  Not all insurance companies cover the cost of " a 3D mammogram. Check with your insurance.            Jun 29, 2018 10:00 AM CDT   PHYSICAL with Mitch Traylor MD   [facility][1] ([facility]1])    [address] [place] [address] [phone]               Care Instructions        Further instructions from your care team       Thyroid Biopsy Discharge Instructions     After you go home:      You may resume your normal diet    Care of Puncture Site:      You may have mild bruising, soreness & swelling at the puncture site. This will go away in a few days    For swelling & bruising, you may use an ice pack on the site.     Activity:      You may go back to your normal activity    Avoid strenuous activity for 24 hours    Medicines:      You may resume all medications    For minor pain, you may take Acetaminophen (Tylenol) or Ibuprofen (Advil)            Call the provider who ordered this test if:      Increased redness or swelling at the site    Fluid or blood oozing from the site    Severe pain at the site    Chills or a fever greater than 101 F (38 C).    Any questions or concerns    Call  911 or go to the Emergency Room if:      Trouble breathing    Your neck swells    Bleeding that you cannot control    Severe difficulty swallowing    If you have questions call:      [facility] Radiology Dept @ [phone]    The provider who performed your procedure was _________________.     Additional Information About Your Visit        MyChart Information     [employer] gives you secure access to your electronic health record. If you see a primary care provider, you can also send messages to your care team and make appointments. If you have questions, please call your primary care clinic.  If you do not have a primary care provider, please call [phone] and they will assist you.        Care EveryWhere ID     This is your Care EveryWhere ID. This could be used by other organizations to access your [facility]  medical records  [id number]         Primary Care Provider Office Phone # Fax #    Mitch Traylor [phone]      Equal Access to Services     CHEO PAK : Mirlande morales taz Blanco, waaxda luqadaha, qaybta kaalmada manju, trung la oscarallison peres laTuckerbharat parker. So [facility] [phone].    ATENCIÓN: Si habla español, tiene a hauser disposición servicios gratuitos de asistencia lingüística. Llame al [phone].    We comply with applicable federal civil rights laws and [place] laws. We do not discriminate on the basis of race, color, national origin, age, disability, sex, sexual orientation, or gender identity.            Thank you!     Thank you for choosing [facility] for your care. Our goal is always to provide you with excellent care. Hearing back from our patients is one way we can continue to improve our services. Please take a few minutes to complete the written survey that you may receive in the mail after you visit with us. Thank you!             Medication List: This is a list of all your medications and when to take them. Check marks below indicate your daily home schedule. Keep this list as a reference.      Medications           Morning Afternoon Evening Bedtime As Needed    aspirin 81 MG tablet   Take 1 tablet (81 mg) by mouth daily                                FLUoxetine 20 MG tablet   Take 1 tablet (20 mg) by mouth every morning                                losartan-hydrochlorothiazide 100-12.5 MG per tablet   Commonly known as:  HYZAAR   Take 1 tablet by mouth daily                                pravastatin 40 MG tablet   Commonly known as:  PRAVACHOL   Take 1 tablet (40 mg) by mouth daily

## 2018-05-29 NOTE — PROGRESS NOTES
Here for Thyroid biopsy. Allergies noted, No fall risk. VSS. Briefly reviewed procedure, PA present and ready to do procedure.   0900 Post procedure assessment. VSS, afebrile. Right neck site CDI, no bleeding slight bruise at needle site, no hematoma. Denies pain. Reviewed d/c instructions with pt, states understanding. D/c per US staff.

## 2018-05-29 NOTE — IP AVS SNAPSHOT
[place] [address]    [place] [address]    Phone:  [phone]                                       After Visit Summary   5/29/2018    Oliva Kearney    MRN: [id number]           After Visit Summary Signature Page     I have received my discharge instructions, and my questions have been answered. I have discussed any challenges I see with this plan with the nurse or doctor.    ..........................................................................................................................................  Patient/Patient Representative Signature      ..........................................................................................................................................  Patient Representative Print Name and Relationship to Patient    ..................................................               ................................................  Date                                            Time    ..........................................................................................................................................  Reviewed by Signature/Title    ...................................................              ..............................................  Date                                                            Time

## 2018-05-30 LAB — COPATH REPORT: NORMAL

## 2018-06-04 ENCOUNTER — HOSPITAL ENCOUNTER (OUTPATIENT)
Dept: MAMMOGRAPHY | Facility: CLINIC | Age: 68
End: 2018-06-04
Attending: INTERNAL MEDICINE
Payer: MEDICARE

## 2018-06-04 DIAGNOSIS — Z12.31 VISIT FOR SCREENING MAMMOGRAM: ICD-10-CM

## 2018-06-04 PROCEDURE — 77067 SCR MAMMO BI INCL CAD: CPT

## 2018-07-02 ENCOUNTER — OFFICE VISIT (OUTPATIENT)
Dept: INTERNAL MEDICINE | Facility: CLINIC | Age: 68
End: 2018-07-02
Payer: COMMERCIAL

## 2018-07-02 VITALS
BODY MASS INDEX: 23.2 KG/M2 | DIASTOLIC BLOOD PRESSURE: 72 MMHG | HEART RATE: 73 BPM | WEIGHT: 135.9 LBS | HEIGHT: 64 IN | OXYGEN SATURATION: 98 % | SYSTOLIC BLOOD PRESSURE: 136 MMHG

## 2018-07-02 DIAGNOSIS — M85.80 OSTEOPENIA, UNSPECIFIED LOCATION: ICD-10-CM

## 2018-07-02 DIAGNOSIS — E78.5 HYPERLIPIDEMIA WITH TARGET LDL LESS THAN 130: ICD-10-CM

## 2018-07-02 DIAGNOSIS — E04.1 THYROID NODULE: ICD-10-CM

## 2018-07-02 DIAGNOSIS — I10 ESSENTIAL HYPERTENSION: ICD-10-CM

## 2018-07-02 DIAGNOSIS — F41.1 ANXIETY STATE: ICD-10-CM

## 2018-07-02 DIAGNOSIS — Z00.00 MEDICARE ANNUAL WELLNESS VISIT, SUBSEQUENT: Primary | ICD-10-CM

## 2018-07-02 PROCEDURE — 99397 PER PM REEVAL EST PAT 65+ YR: CPT | Performed by: INTERNAL MEDICINE

## 2018-07-02 ASSESSMENT — ACTIVITIES OF DAILY LIVING (ADL)
I_NEED_ASSISTANCE_FOR_THE_FOLLOWING_DAILY_ACTIVITIES:: NO ASSISTANCE IS NEEDED
CURRENT_FUNCTION: NO ASSISTANCE NEEDED

## 2018-07-02 NOTE — PROGRESS NOTES
SUBJECTIVE:   Oliva Kearney is a 68 year old female who presents for Preventive Visit.      Are you in the first 12 months of your Medicare coverage?  No    Physical   Annual:     Getting at least 3 servings of Calcium per day:  Yes    Bi-annual eye exam:  Yes    Dental care twice a year:  Yes    Sleep apnea or symptoms of sleep apnea:  None    Diet:  Regular (no restrictions)    Taking medications regularly:  Yes    Additional concerns today:  No    Ability to successfully perform activities of daily living: no assistance needed    Home Safety:  Lack of grab bars in the bathroom    Hearing Impairment: no hearing concerns      Ability to successfully perform activities of daily living: Yes, no assistance needed  Home safety:  none identified   Hearing impairment: No    Fall risk:  Fallen 2 or more times in the past year?: No  Any fall with injury in the past year?: No    COGNITIVE SCREEN  1) Repeat 3 items (Leader, Season, Table)    2) Clock draw: ABNORMAL Did not put all numbers on the clock  3) 3 item recall: Recalls 3 objects  Results: 3 items recalled: COGNITIVE IMPAIRMENT LESS LIKELY    Mini-CogTM Copyright ARMEN Mckay. Licensed by the author for use in [facility]; reprinted with permission (soob@[email or web address]). All rights reserved.        Reviewed and updated as needed this visit by clinical staff  Allergies  Meds         Reviewed and updated as needed this visit by Provider        Social History   Substance Use Topics     Smoking status: Former Smoker     Packs/day: 0.25     Years: 10.00     Types: Cigarettes     Quit date: 10/26/1969     Smokeless tobacco: Never Used      Comment: light cigarette use     Alcohol use Yes      Comment: 1 glass wine per day max       Alcohol Use 7/2/2018   If you drink alcohol do you typically have greater than 3 drinks per day OR greater than 7 drinks per week? No               Today's PHQ-2 Score:   PHQ-2 ( 1999 Pf[employer]) 7/2/2018   Q1: Little interest or pleasure  in doing things 0   Q2: Feeling down, depressed or hopeless 0   PHQ-2 Score 0   Q1: Little interest or pleasure in doing things Not at all   Q2: Feeling down, depressed or hopeless Not at all   PHQ-2 Score 0       Do you feel safe in your environment - Yes    Do you have a Health Care Directive?: Yes: Patient states has Advance Directive and will bring in a copy to clinic.    Current providers sharing in care for this patient include:   Patient Care Team:  Mitch Traylor MD as PCP - General    The following health maintenance items are reviewed in Epic and correct as of today:  Health Maintenance   Topic Date Due     ADVANCE DIRECTIVE PLANNING Q5 YRS  12/05/2016     FALL RISK ASSESSMENT  06/26/2018     INFLUENZA VACCINE (1) 09/01/2018     COLONOSCOPY Q5 YR  04/16/2019     DEXA Q3 YR  04/21/2019     PHQ-2 Q1 YR  05/11/2019     MAMMO SCREEN Q2 YR (SYSTEM ASSIGNED)  06/04/2020     LIPID SCREEN Q5 YR FEMALE (SYSTEM ASSIGNED)  04/11/2023     TETANUS IMMUNIZATION (SYSTEM ASSIGNED)  05/11/2026     PNEUMOCOCCAL  Completed     HEPATITIS C SCREENING  Completed     Labs reviewed in EPIC        Review of Systems  CONSTITUTIONAL: NEGATIVE for fever, chills, change in weight  INTEGUMENTARY/SKIN: NEGATIVE for worrisome rashes, moles or lesions  EYES: NEGATIVE for vision changes or irritation. Eye exam scheduled this Fall. Last 1 year ago  ENT/MOUTH: NEGATIVE for ear, mouth and throat problems. resolving mild nasal congestion  RESP: NEGATIVE for significant cough or SOB. Old lung nodules. Stable on CTs  BREAST: NEGATIVE for masses, tenderness or discharge  CV: NEGATIVE for chest pain, palpitations or peripheral edema  GI: NEGATIVE for nausea, abdominal pain, heartburn, or change in bowel habits  : NEGATIVE for frequency, dysuria, or hematuria. No vaginal sx  MUSCULOSKELETAL: NEGATIVE for significant arthralgias or myalgia that  Limits activity. Hx osteoarthritis   NEURO: NEGATIVE for weakness, dizziness or paresthesias  ENDOCRINE:  "NEGATIVE for temperature intolerance. Hx thyroid nodule with neg bx. Repeat U/S in 1 year  HEME: NEGATIVE for bleeding problems  PSYCHIATRIC: NEGATIVE for changes in mood or affect    OBJECTIVE:   /72  Pulse 73  Ht 5' 4\" (1.626 m)  Wt 135 lb 14.4 oz (61.6 kg)  SpO2 98%  BMI 23.33 kg/m2 Estimated body mass index is 23.05 kg/(m^2) as calculated from the following:    Height as of 5/11/18: 5' 4\" (1.626 m).    Weight as of 5/11/18: 134 lb 4.8 oz (60.9 kg).  Physical Exam  General appearance - healthy, alert, no distress  Skin - No rashes or lesions.  Head - normocephalic, atraumatic  Eyes - MANDO, EOMI, fundi exam with nondilated pupils negative.  Ears - External ears normal. Canals clear. TM's normal.  Nose/Sinuses - Nares normal. Septum midline. Mucosa normal. No drainage or sinus tenderness.  Oropharynx - No erythema, no adenopathy, no exudates.  Neck - Supple without adenopathy. No bruits.  Right thyroid nodule palpable and unchanged from previous exam  Lungs - Clear to auscultation without wheezes/rhonchi.  Heart - Regular rate and rhythm without murmurs, clicks, or gallops.  Nodes - No supraclavicular, axillary, or inguinal adenopathy palpable.  Breasts - deferred  Abdomen - Abdomen soft, non-tender. BS normal. No masses or hepatosplenomegaly palpable. No bruits.  Extremities -No cyanosis, clubbing or edema.    Musculoskeletal - Spine ROM normal. Muscular strength intact.   Peripheral pulses - radial=4/4, femoral=4/4, posterior tibial=4/4, dorsalis pedis=4/4,  Neuro - Gait normal. Reflexes normal and symmetric. Sensation grossly WNL.  Genital/Rectal - deferred          ASSESSMENT / PLAN:    1. Medicare annual wellness visit, subsequent  Mammogram, DEXA, colonoscopy, vaccinations up-to-date.  See discussion below regarding Shingrix    2. Essential hypertension  Controlled.  Continue current medication.  Repeat lab next spring  - Comprehensive metabolic panel; Future    3. Thyroid nodule  Benign biopsy " "May 2018.  Repeat ultrasound one year.  Future labs ordered  - TSH with free T4 reflex; Future    4. Hyperlipidemia with target LDL less than 130  Continue statin therapy.  Recent labs from April done in [place] reviewed.  Repeat labs in spring 2019  - Comprehensive metabolic panel; Future  - Lipid panel reflex to direct LDL Fasting; Future    5. Anxiety state  Controlled.  Continue fluoxetine. MARCOS = 0    6. Osteopenia, unspecified location  On drug holiday with previous Boniva treatment.  Repeat DEXA in 1 year        End of Life Planning:  Patient currently has an advanced directive: Yes.  Practitioner is supportive of decision.    COUNSELING:  Reviewed preventive health counseling, as reflected in patient instructions    BP Readings from Last 1 Encounters:   05/29/18 155/72     Estimated body mass index is 23.05 kg/(m^2) as calculated from the following:    Height as of 5/11/18: 5' 4\" (1.626 m).    Weight as of 5/11/18: 134 lb 4.8 oz (60.9 kg).           reports that she quit smoking about 48 years ago. Her smoking use included Cigarettes. She has a 2.50 pack-year smoking history. She has never used smokeless tobacco.      Appropriate preventive services were discussed with this patient, including applicable screening as appropriate for cardiovascular disease, diabetes, osteopenia/osteoporosis, and glaucoma.  As appropriate for age/gender, discussed screening for colorectal cancer, prostate cancer, breast cancer, and cervical cancer. Checklist reviewing preventive services available has been given to the patient.    Reviewed patients plan of care and provided an AVS. The Basic Care Plan (routine screening as documented in Health Maintenance) for Oliva meets the Care Plan requirement. This Care Plan has been established and reviewed with the Patient.    Counseling Resources:  ATP IV Guidelines  Pooled Cohorts Equation Calculator  Breast Cancer Risk Calculator  FRAX Risk Assessment  ICSI Preventive " Guidelines  Dietary Guidelines for Americans, 2010  USDA's MyPlate  ASA Prophylaxis  Lung CA Screening    Mitch Traylor MD  [facility]

## 2018-07-02 NOTE — MR AVS SNAPSHOT
After Visit Summary   7/2/2018    Oliva Kearney    MRN: [id number]           Patient Information     Date Of Birth          1950        Visit Information        Provider Department      7/2/2018 10:30 AM Mitch Traylor MD [facility]        Today's Diagnoses     Medicare annual wellness visit, subsequent    -  1    Essential hypertension        Thyroid nodule        Hyperlipidemia with target LDL less than 130        Anxiety state        Osteopenia, unspecified location          Care Instructions      Preventive Health Recommendations    Female Ages 65 +    Yearly exam:     See your health care provider every year in order to  o Review health changes.   o Discuss preventive care.    o Review your medicines if your doctor has prescribed any.      You no longer need a yearly Pap test unless you've had an abnormal Pap test in the past 10 years. If you have vaginal symptoms, such as bleeding or discharge, be sure to talk with your provider about a Pap test.      Every 1 to 2 years, have a mammogram.  If you are over 69, talk with your health care provider about whether or not you want to continue having screening mammograms.      Every 10 years, have a colonoscopy. Or, have a yearly FIT test (stool test). These exams will check for colon cancer.       Have a cholesterol test every 5 years, or more often if your doctor advises it.       Have a diabetes test (fasting glucose) every three years. If you are at risk for diabetes, you should have this test more often.       At age 65, have a bone density scan (DEXA) to check for osteoporosis (brittle bone disease).    Shots:    Get a flu shot each year.    Get a tetanus shot every 10 years.    Talk to your doctor about your pneumonia vaccines. There are now two you should receive - Pneumovax (PPSV 23) and Prevnar (PCV 13).    Talk to your pharmacist about the shingles vaccine.    Talk to your doctor about the hepatitis B  vaccine.    Nutrition:     Eat at least 5 servings of fruits and vegetables each day.      Eat whole-grain bread, whole-wheat pasta and brown rice instead of white grains and rice.      Get adequate Calcium and Vitamin D.     Lifestyle    Exercise at least 150 minutes a week (30 minutes a day, 5 days a week). This will help you control your weight and prevent disease.      Limit alcohol to one drink per day.      No smoking.       Wear sunscreen to prevent skin cancer.       See your dentist twice a year for an exam and cleaning.      See your eye doctor every 1 to 2 years to screen for conditions such as glaucoma, macular degeneration and cataracts.          Follow-ups after your visit        Who to contact     If you have questions or need follow up information about today's clinic visit or your schedule please contact [facility] directly at [phone].  Normal or non-critical lab and imaging results will be communicated to you by [employer], letter or phone within 4 business days after the clinic has received the results. If you do not hear from us within 7 days, please contact the clinic through [employer] or phone. If you have a critical or abnormal lab result, we will notify you by phone as soon as possible.  Submit refill requests through [employer] or call your pharmacy and they will forward the refill request to us. Please allow 3 business days for your refill to be completed.          Additional Information About Your Visit        [employer] Information     [employer] gives you secure access to your electronic health record. If you see a primary care provider, you can also send messages to your care team and make appointments. If you have questions, please call your primary care clinic.  If you do not have a primary care provider, please call [phone] and they will assist you.        Care EveryWhere ID     This is your Care EveryWhere ID. This could be used by other organizations to access  "your [facility] medical records  [id number]        Your Vitals Were     Pulse Height Pulse Oximetry BMI (Body Mass Index)          73 5' 4\" (1.626 m) 98% 23.33 kg/m2         Blood Pressure from Last 3 Encounters:   07/02/18 136/72   05/29/18 155/72   05/11/18 144/76    Weight from Last 3 Encounters:   07/02/18 135 lb 14.4 oz (61.6 kg)   05/11/18 134 lb 4.8 oz (60.9 kg)   06/26/17 134 lb (60.8 kg)               Primary Care Provider Office Phone # Fax #    Mitch Traylor [phone]       [address] [facility] [address]        Equal Access to Services     CHEO PAK : Mirlande meléndezo Soluz, waaxda luqadaha, qaybta kaalmada adeegyada, trung middleton . So [facility] [phone].    ATENCIÓN: Si habla español, tiene a hauser disposición servicios gratuitos de asistencia lingüística. Llame al [phone].    We comply with applicable federal civil rights laws and [place] laws. We do not discriminate on the basis of race, color, national origin, age, disability, sex, sexual orientation, or gender identity.            Thank you!     Thank you for choosing [facility]  for your care. Our goal is always to provide you with excellent care. Hearing back from our patients is one way we can continue to improve our services. Please take a few minutes to complete the written survey that you may receive in the mail after your visit with us. Thank you!             Your Updated Medication List - Protect others around you: Learn how to safely use, store and throw away your medicines at www.disposemymeds.org.          This list is accurate as of 7/2/18 11:59 PM.  Always use your most recent med list.                   Brand Name Dispense Instructions for use Diagnosis    aspirin 81 MG tablet     30 tablet    Take 1 tablet (81 mg) by mouth daily    Essential hypertension       FLUoxetine 20 MG tablet     90 tablet    Take 1 tablet (20 mg) by mouth every morning    " Anxiety state       losartan-hydrochlorothiazide 100-12.5 MG per tablet    HYZAAR    90 tablet    Take 1 tablet by mouth daily    Essential hypertension       pravastatin 40 MG tablet    PRAVACHOL    90 tablet    Take 1 tablet (40 mg) by mouth daily    Hyperlipidemia with target LDL less than 130

## 2018-07-07 PROBLEM — M85.80 OSTEOPENIA, UNSPECIFIED LOCATION: Status: ACTIVE | Noted: 2018-07-07

## 2018-07-07 ASSESSMENT — ANXIETY QUESTIONNAIRES
GAD7 TOTAL SCORE: 0
1. FEELING NERVOUS, ANXIOUS, OR ON EDGE: NOT AT ALL
2. NOT BEING ABLE TO STOP OR CONTROL WORRYING: NOT AT ALL
3. WORRYING TOO MUCH ABOUT DIFFERENT THINGS: NOT AT ALL
5. BEING SO RESTLESS THAT IT IS HARD TO SIT STILL: NOT AT ALL
IF YOU CHECKED OFF ANY PROBLEMS ON THIS QUESTIONNAIRE, HOW DIFFICULT HAVE THESE PROBLEMS MADE IT FOR YOU TO DO YOUR WORK, TAKE CARE OF THINGS AT HOME, OR GET ALONG WITH OTHER PEOPLE: NOT DIFFICULT AT ALL
7. FEELING AFRAID AS IF SOMETHING AWFUL MIGHT HAPPEN: NOT AT ALL
6. BECOMING EASILY ANNOYED OR IRRITABLE: NOT AT ALL

## 2018-07-07 ASSESSMENT — PATIENT HEALTH QUESTIONNAIRE - PHQ9: 5. POOR APPETITE OR OVEREATING: NOT AT ALL

## 2018-07-08 ASSESSMENT — ANXIETY QUESTIONNAIRES: GAD7 TOTAL SCORE: 0

## 2018-09-07 DIAGNOSIS — F41.1 ANXIETY STATE: ICD-10-CM

## 2018-09-07 NOTE — TELEPHONE ENCOUNTER
"Requested Prescriptions   Pending Prescriptions Disp Refills     FLUoxetine 20 MG tablet [Pharmacy Med Name: FLUOXETINE 20MG TABLETS] 90 tablet 0     Sig: TAKE 1 TABLET(20 MG) BY MOUTH EVERY MORNING    SSRIs Protocol Passed    9/7/2018 11:06 AM       Passed - Recent (12 mo) or future (30 days) visit within the authorizing provider's specialty    Patient had office visit in the last 12 months or has a visit in the next 30 days with authorizing provider or within the authorizing provider's specialty.  See \"Patient Info\" tab in inbasket, or \"Choose Columns\" in Meds & Orders section of the refill encounter.           Passed - Patient is age 18 or older       Passed - No active pregnancy on record       Passed - No positive pregnancy test in last 12 months        Last Written Prescription Date:  05/12/2018  Last Fill Quantity: 90,  # refills: 3   Last office visit: 7/2/2018 with prescribing provider:  Dr Traylor   Future Office Visit:        MARCOS-7 SCORE 4/25/2016 6/26/2017 7/7/2018   Total Score - - -   Total Score 0 0 0       PHQ-9 SCORE 10/28/2008 11/5/2009 12/3/2010   Total Score 0 0 0       "

## 2018-09-09 ENCOUNTER — MYC MEDICAL ADVICE (OUTPATIENT)
Dept: INTERNAL MEDICINE | Facility: CLINIC | Age: 68
End: 2018-09-09

## 2018-09-11 RX ORDER — FLUOXETINE 20 MG/1
TABLET, FILM COATED ORAL
Qty: 90 TABLET | Refills: 2 | Status: SHIPPED | OUTPATIENT
Start: 2018-09-11 | End: 2019-07-05

## 2019-01-26 ENCOUNTER — MYC MEDICAL ADVICE (OUTPATIENT)
Dept: INTERNAL MEDICINE | Facility: CLINIC | Age: 69
End: 2019-01-26

## 2019-01-26 DIAGNOSIS — E78.5 HYPERLIPIDEMIA WITH TARGET LDL LESS THAN 130: ICD-10-CM

## 2019-01-26 DIAGNOSIS — I10 ESSENTIAL HYPERTENSION: ICD-10-CM

## 2019-01-26 DIAGNOSIS — F41.1 ANXIETY STATE: ICD-10-CM

## 2019-01-28 RX ORDER — LOSARTAN POTASSIUM AND HYDROCHLOROTHIAZIDE 12.5; 1 MG/1; MG/1
1 TABLET ORAL DAILY
Qty: 90 TABLET | Refills: 0 | Status: SHIPPED | OUTPATIENT
Start: 2019-01-28 | End: 2019-04-04

## 2019-01-28 RX ORDER — FLUOXETINE 20 MG/1
20 TABLET, FILM COATED ORAL EVERY MORNING
Qty: 90 TABLET | Refills: 0 | Status: SHIPPED | OUTPATIENT
Start: 2019-01-28 | End: 2019-07-05

## 2019-01-28 RX ORDER — PRAVASTATIN SODIUM 40 MG
40 TABLET ORAL DAILY
Qty: 90 TABLET | Refills: 0 | Status: SHIPPED | OUTPATIENT
Start: 2019-01-28 | End: 2019-04-04

## 2019-02-05 ENCOUNTER — MYC MEDICAL ADVICE (OUTPATIENT)
Dept: INTERNAL MEDICINE | Facility: CLINIC | Age: 69
End: 2019-02-05

## 2019-02-05 DIAGNOSIS — F41.1 ANXIETY STATE: Primary | ICD-10-CM

## 2019-04-04 DIAGNOSIS — E78.5 HYPERLIPIDEMIA WITH TARGET LDL LESS THAN 130: ICD-10-CM

## 2019-04-04 DIAGNOSIS — I10 ESSENTIAL HYPERTENSION: ICD-10-CM

## 2019-04-04 RX ORDER — LOSARTAN POTASSIUM AND HYDROCHLOROTHIAZIDE 12.5; 1 MG/1; MG/1
TABLET ORAL
Qty: 90 TABLET | Refills: 0 | Status: SHIPPED | OUTPATIENT
Start: 2019-04-04 | End: 2019-07-05

## 2019-04-04 RX ORDER — PRAVASTATIN SODIUM 40 MG
TABLET ORAL
Qty: 90 TABLET | Refills: 0 | Status: SHIPPED | OUTPATIENT
Start: 2019-04-04 | End: 2019-07-05

## 2019-04-04 NOTE — TELEPHONE ENCOUNTER
"Requested Prescriptions   Pending Prescriptions Disp Refills     pravastatin (PRAVACHOL) 40 MG tablet [Pharmacy Med Name: PRAVASTATIN SODIUM 40 MG Tablet] 90 tablet 0     Sig: TAKE 1 TABLET EVERY DAY    Statins Protocol Failed - 4/4/2019 12:58 PM       Failed - LDL on file in past 12 months    Recent Labs   Lab Test 06/21/17  1041   LDL 88            Passed - No abnormal creatine kinase in past 12 months    Recent Labs   Lab Test 10/07/11  0949   CKT 98               Passed - Recent (12 mo) or future (30 days) visit within the authorizing provider's specialty    Patient had office visit in the last 12 months or has a visit in the next 30 days with authorizing provider or within the authorizing provider's specialty.  See \"Patient Info\" tab in inbasket, or \"Choose Columns\" in Meds & Orders section of the refill encounter.             Passed - Medication is active on med list       Passed - Patient is age 18 or older       Passed - No active pregnancy on record       Passed - No positive pregnancy test in past 12 months        losartan-hydrochlorothiazide (HYZAAR) 100-12.5 MG tablet [Pharmacy Med Name: LOSARTAN POTASSIUM/HYDROCHLOROTHIAZIDE 100-12.5 MG Tablet] 90 tablet 0     Sig: TAKE 1 TABLET EVERY DAY    Angiotensin-II Receptors Passed - 4/4/2019 12:58 PM       Passed - Blood pressure under 140/90 in past 12 months    BP Readings from Last 3 Encounters:   07/02/18 136/72   05/29/18 155/72   05/11/18 144/76                Passed - Recent (12 mo) or future (30 days) visit within the authorizing provider's specialty    Patient had office visit in the last 12 months or has a visit in the next 30 days with authorizing provider or within the authorizing provider's specialty.  See \"Patient Info\" tab in inbasket, or \"Choose Columns\" in Meds & Orders section of the refill encounter.             Passed - Medication is active on med list       Passed - Patient is age 18 or older       Passed - No active pregnancy on record    "    Passed - Normal serum creatinine on file in past 12 months    Recent Labs   Lab Test 04/11/18   CR 0.68            Passed - Normal serum potassium on file in past 12 months    Recent Labs   Lab Test 04/11/18   POTASSIUM 4.0                   Passed - No positive pregnancy test in past 12 months

## 2019-06-07 ENCOUNTER — HOSPITAL ENCOUNTER (OUTPATIENT)
Dept: MAMMOGRAPHY | Facility: CLINIC | Age: 69
Discharge: HOME | End: 2019-06-07
Attending: INTERNAL MEDICINE | Admitting: INTERNAL MEDICINE
Payer: MEDICARE

## 2019-06-07 DIAGNOSIS — Z12.31 VISIT FOR SCREENING MAMMOGRAM: ICD-10-CM

## 2019-06-07 PROCEDURE — 77067 SCR MAMMO BI INCL CAD: CPT

## 2019-07-01 ENCOUNTER — MYC MEDICAL ADVICE (OUTPATIENT)
Dept: INTERNAL MEDICINE | Facility: CLINIC | Age: 69
End: 2019-07-01

## 2019-07-01 NOTE — TELEPHONE ENCOUNTER
Few future orders already in place from previous with 5/1 expected date, anything else needed? Patient already has lab appt scheduled for 7/3.

## 2019-07-03 DIAGNOSIS — E04.1 THYROID NODULE: ICD-10-CM

## 2019-07-03 DIAGNOSIS — I10 ESSENTIAL HYPERTENSION: ICD-10-CM

## 2019-07-03 DIAGNOSIS — E78.5 HYPERLIPIDEMIA WITH TARGET LDL LESS THAN 130: ICD-10-CM

## 2019-07-03 LAB
ALBUMIN SERPL-MCNC: 3.8 G/DL (ref 3.4–5)
ALP SERPL-CCNC: 84 U/L (ref 40–150)
ALT SERPL W P-5'-P-CCNC: 28 U/L (ref 0–50)
ANION GAP SERPL CALCULATED.3IONS-SCNC: 8 MMOL/L (ref 3–14)
AST SERPL W P-5'-P-CCNC: 22 U/L (ref 0–45)
BILIRUB SERPL-MCNC: 0.4 MG/DL (ref 0.2–1.3)
BUN SERPL-MCNC: 18 MG/DL (ref 7–30)
CALCIUM SERPL-MCNC: 8.8 MG/DL (ref 8.5–10.1)
CHLORIDE SERPL-SCNC: 108 MMOL/L (ref 94–109)
CHOLEST SERPL-MCNC: 194 MG/DL
CO2 SERPL-SCNC: 26 MMOL/L (ref 20–32)
CREAT SERPL-MCNC: 0.69 MG/DL (ref 0.52–1.04)
GFR SERPL CREATININE-BSD FRML MDRD: 88 ML/MIN/{1.73_M2}
GLUCOSE SERPL-MCNC: 96 MG/DL (ref 70–99)
HDLC SERPL-MCNC: 92 MG/DL
LDLC SERPL CALC-MCNC: 92 MG/DL
NONHDLC SERPL-MCNC: 102 MG/DL
POTASSIUM SERPL-SCNC: 4.1 MMOL/L (ref 3.4–5.3)
PROT SERPL-MCNC: 6.9 G/DL (ref 6.8–8.8)
SODIUM SERPL-SCNC: 142 MMOL/L (ref 133–144)
TRIGL SERPL-MCNC: 48 MG/DL
TSH SERPL DL<=0.005 MIU/L-ACNC: 3.02 MU/L (ref 0.4–4)

## 2019-07-03 PROCEDURE — 80061 LIPID PANEL: CPT | Performed by: INTERNAL MEDICINE

## 2019-07-03 PROCEDURE — 36415 COLL VENOUS BLD VENIPUNCTURE: CPT | Performed by: INTERNAL MEDICINE

## 2019-07-03 PROCEDURE — 84443 ASSAY THYROID STIM HORMONE: CPT | Performed by: INTERNAL MEDICINE

## 2019-07-03 PROCEDURE — 80053 COMPREHEN METABOLIC PANEL: CPT | Performed by: INTERNAL MEDICINE

## 2019-07-05 ENCOUNTER — OFFICE VISIT (OUTPATIENT)
Dept: INTERNAL MEDICINE | Facility: CLINIC | Age: 69
End: 2019-07-05
Payer: MEDICARE

## 2019-07-05 VITALS
BODY MASS INDEX: 23.56 KG/M2 | WEIGHT: 138 LBS | RESPIRATION RATE: 16 BRPM | HEIGHT: 64 IN | SYSTOLIC BLOOD PRESSURE: 112 MMHG | DIASTOLIC BLOOD PRESSURE: 70 MMHG | TEMPERATURE: 98 F | OXYGEN SATURATION: 97 % | HEART RATE: 68 BPM

## 2019-07-05 DIAGNOSIS — Z00.00 MEDICARE ANNUAL WELLNESS VISIT, SUBSEQUENT: Primary | ICD-10-CM

## 2019-07-05 DIAGNOSIS — E04.1 THYROID NODULE: ICD-10-CM

## 2019-07-05 DIAGNOSIS — E78.5 HYPERLIPIDEMIA WITH TARGET LDL LESS THAN 130: ICD-10-CM

## 2019-07-05 DIAGNOSIS — Z78.0 ASYMPTOMATIC MENOPAUSAL STATE: ICD-10-CM

## 2019-07-05 DIAGNOSIS — Z12.11 SPECIAL SCREENING FOR MALIGNANT NEOPLASMS, COLON: ICD-10-CM

## 2019-07-05 DIAGNOSIS — F41.1 ANXIETY STATE: ICD-10-CM

## 2019-07-05 DIAGNOSIS — I10 ESSENTIAL HYPERTENSION: ICD-10-CM

## 2019-07-05 PROCEDURE — G0439 PPPS, SUBSEQ VISIT: HCPCS | Performed by: INTERNAL MEDICINE

## 2019-07-05 PROCEDURE — 99214 OFFICE O/P EST MOD 30 MIN: CPT | Mod: 25 | Performed by: INTERNAL MEDICINE

## 2019-07-05 RX ORDER — LOSARTAN POTASSIUM AND HYDROCHLOROTHIAZIDE 12.5; 1 MG/1; MG/1
TABLET ORAL
Qty: 45 TABLET | Refills: 3 | Status: SHIPPED | OUTPATIENT
Start: 2019-07-05 | End: 2020-08-21

## 2019-07-05 RX ORDER — PRAVASTATIN SODIUM 40 MG
TABLET ORAL
Qty: 90 TABLET | Refills: 3 | Status: SHIPPED | OUTPATIENT
Start: 2019-07-05 | End: 2020-06-22

## 2019-07-05 ASSESSMENT — PATIENT HEALTH QUESTIONNAIRE - PHQ9: 5. POOR APPETITE OR OVEREATING: NOT AT ALL

## 2019-07-05 ASSESSMENT — ANXIETY QUESTIONNAIRES
7. FEELING AFRAID AS IF SOMETHING AWFUL MIGHT HAPPEN: NOT AT ALL
GAD7 TOTAL SCORE: 5
1. FEELING NERVOUS, ANXIOUS, OR ON EDGE: NEARLY EVERY DAY
6. BECOMING EASILY ANNOYED OR IRRITABLE: NOT AT ALL
2. NOT BEING ABLE TO STOP OR CONTROL WORRYING: MORE THAN HALF THE DAYS
5. BEING SO RESTLESS THAT IT IS HARD TO SIT STILL: NOT AT ALL
3. WORRYING TOO MUCH ABOUT DIFFERENT THINGS: NOT AT ALL
IF YOU CHECKED OFF ANY PROBLEMS ON THIS QUESTIONNAIRE, HOW DIFFICULT HAVE THESE PROBLEMS MADE IT FOR YOU TO DO YOUR WORK, TAKE CARE OF THINGS AT HOME, OR GET ALONG WITH OTHER PEOPLE: NOT DIFFICULT AT ALL

## 2019-07-05 ASSESSMENT — ACTIVITIES OF DAILY LIVING (ADL): CURRENT_FUNCTION: NO ASSISTANCE NEEDED

## 2019-07-05 ASSESSMENT — MIFFLIN-ST. JEOR: SCORE: 1130.96

## 2019-07-05 NOTE — PATIENT INSTRUCTIONS
Continue current meds  Prescriptions refilled.    Call  [phone] or use [facility] to schedule a future lab appointment  fasting in 1 year.   Follow up with me a few days after these future labs are drawn to review results and other medical issues as necessary  Bone density test  [place]  Thyroid ultrasound - [place]   Colonoscopy  with  [facility]. They will call to schedule. If not heard from them in 1 week, then call [phone].   Pt was informed regarding extra E&M billing for management of established medical issues not related to wellness visit

## 2019-07-05 NOTE — PROGRESS NOTES
"SUBJECTIVE:   Oliva Kearney is a 69 year old female who presents for Preventive Visit and follow-up established medical issues including hypertension, anxiety, hyperlipidemia and thyroid nodules.      Are you in the first 12 months of your Medicare coverage?  No    Healthy Habits:     In general, how would you rate your overall health?  Excellent    Frequency of exercise:  4-5 days/week    Duration of exercise:  30-45 minutes    Do you usually eat at least 4 servings of fruit and vegetables a day, include whole grains    & fiber and avoid regularly eating high fat or \"junk\" foods?  Yes    Taking medications regularly:  No    Barriers to taking medications:  None    Medication side effects:  None    Ability to successfully perform activities of daily living:  No assistance needed    Home Safety:  No safety concerns identified    Hearing Impairment:  No hearing concerns    In the past 6 months, have you been bothered by leaking of urine? Yes (Once in a while )    In general, how would you rate your overall mental or emotional health?  Very good      PHQ-2 Total Score: 0    Additional concerns today:  No    Do you feel safe in your environment? Yes    Do you have a Health Care Directive? Yes: Patient states has Advance Directive and will bring in a copy to clinic.      Fall risk  Fallen 2 or more times in the past year?: No  Any fall with injury in the past year?: No    Cognitive Screening   1) Repeat 3 items (Leader, Season, Table)    2) Clock draw: NORMAL  3) 3 item recall: Recalls 3 objects  Results: 3 items recalled: COGNITIVE IMPAIRMENT LESS LIKELY    Mini-CogTM Copyright ARMEN Mckay. Licensed by the author for use in [facility]; reprinted with permission (martine@.[facility]). All rights reserved.      Do you have sleep apnea, excessive snoring or daytime drowsiness?: no    Reviewed and updated as needed this visit by clinical staff  Tobacco  Allergies  Meds         Reviewed and updated as needed " this visit by Provider        Social History     Tobacco Use     Smoking status: Former Smoker     Packs/day: 0.25     Years: 10.00     Pack years: 2.50     Types: Cigarettes     Last attempt to quit: 10/26/1969     Years since quittin.7     Smokeless tobacco: Never Used     Tobacco comment: light cigarette use   Substance Use Topics     Alcohol use: Yes     Comment: 1 glass wine per day max     If you drink alcohol do you typically have >3 drinks per day or >7 drinks per week? No      Alcohol Use 2019   Prescreen: >3 drinks/day or >7 drinks/week? -   Prescreen: >3 drinks/day or >7 drinks/week? Yes               Current providers sharing in care for this patient include:   Patient Care Team:  Mitch Traylor MD as PCP - General  Mitch Traylor MD as Assigned PCP    The following health maintenance items are reviewed in Epic and correct as of today:  Health Maintenance   Topic Date Due     ADVANCE CARE PLANNING  2016     PHQ-2  2019     COLONOSCOPY  2019     DEXA  2019     FALL RISK ASSESSMENT  2019     MEDICARE ANNUAL WELLNESS VISIT  2019     INFLUENZA VACCINE (1) 2019     MAMMO SCREENING  2021     LIPID  2024     DTAP/TDAP/TD IMMUNIZATION (3 - Td) 2026     HEPATITIS C SCREENING  Completed     PNEUMOCOCCAL IMMUNIZATION 65+ LOW/MEDIUM RISK  Completed     ZOSTER IMMUNIZATION  Completed     IPV IMMUNIZATION  Aged Out     MENINGITIS IMMUNIZATION  Aged Out     Labs reviewed in EPIC      Review of Systems  CONSTITUTIONAL: NEGATIVE for fever, chills. Weight up 2 pounds  INTEGUMENTARY/SKIN: NEGATIVE for worrisome rashes, moles or lesions  EYES: NEGATIVE for vision changes or irritation  ENT/MOUTH: NEGATIVE for ear, mouth and throat problems  RESP: NEGATIVE for significant cough or SOB  BREAST: NEGATIVE for masses, tenderness or discharge  CV: NEGATIVE for chest pain, palpitations or peripheral edema  GI: NEGATIVE for nausea, abdominal pain, heartburn, or  "change in bowel habits. Due for colonoscopy screening  : NEGATIVE for frequency, dysuria, or hematuria  MUSCULOSKELETAL: NEGATIVE for significant arthralgias or myalgia that stops activity. Has osteoarthritis in hands  NEURO: NEGATIVE for weakness, dizziness or paresthesias  ENDOCRINE: NEGATIVE for temperature intolerance, skin/hair changes  HEME: NEGATIVE for bleeding problems  PSYCHIATRIC:  Hx anxiety related to  ETOH use.  No abuse issues.  MARCOS = 5.  Patient states she is fine with the current dose of fluoxetine does not wish to change medication dosage and declines need for therapy.   has been through treatment for alcohol abuse and currently sober though has had intermittent episodes of \"falling off the wagon\"    OBJECTIVE:   /70   Pulse 68   Temp 98  F (36.7  C) (Oral)   Resp 16   Ht 1.626 m (5' 4\")   Wt 62.6 kg (138 lb)   SpO2 97%   BMI 23.69 kg/m   Estimated body mass index is 23.69 kg/m  as calculated from the following:    Height as of this encounter: 1.626 m (5' 4\").    Weight as of this encounter: 62.6 kg (138 lb).  Physical Exam  General appearance - healthy, alert, no distress  Skin - No rashes or lesions.  Head - normocephalic, atraumatic  Eyes - MANDO, EOMI, fundi exam with nondilated pupils negative.  Ears - External ears normal. Canals clear. TM's normal.  Nose/Sinuses - Nares normal. Septum midline. Mucosa normal. No drainage or sinus tenderness.  Oropharynx - No erythema, no adenopathy, no exudates.  Neck - Supple without adenopathy.  No bruits. Thyroid nodules palpable right side similar in size to previous exam  Lungs - Clear to auscultation without wheezes/rhonchi.  Heart - Regular rate and rhythm without murmurs, clicks, or gallops.  Nodes - No supraclavicular, axillary, or inguinal adenopathy palpable.  Breasts - deferred  Abdomen - Abdomen soft, non-tender. BS normal. No masses or hepatosplenomegaly palpable. No bruits.  Extremities -No cyanosis, clubbing or " edema.    Musculoskeletal - Spine ROM normal. Muscular strength intact.   Peripheral pulses - radial=4/4, femoral=4/4, posterior tibial=4/4, dorsalis pedis=4/4,  Neuro - Gait normal. Reflexes normal and symmetric. Sensation grossly WNL.  Genital/Rectal - deferred        ASSESSMENT / PLAN:   1. Medicare annual wellness visit, subsequent  Counseled regarding a flu vaccine this fall.  Recently had a mammogram.  Colonoscopy order placed as below.  Other health maintenance/vaccinations up-to-date.      2. Hyperlipidemia with target LDL less than 130  Controlled.  Continue current medication.  Repeat labs 1 year  - pravastatin (PRAVACHOL) 40 MG tablet; TAKE 1 TABLET EVERY DAY  Dispense: 90 tablet; Refill: 3  - Comprehensive metabolic panel; Future  - Lipid panel reflex to direct LDL Fasting; Future  - OFFICE/OUTPT VISIT,EST,LEVL IV    3. Essential hypertension  Controlled.  Continue current medication.  Repeat labs 1 year  - losartan-hydrochlorothiazide (HYZAAR) 100-12.5 MG tablet; TAKE 1/2 TABLET EVERY DAY  Dispense: 45 tablet; Refill: 3  - Comprehensive metabolic panel; Future  - OFFICE/OUTPT VISIT,EST,LEVL IV    4. Anxiety state   MARCOS mildly elevated.  Patient feels symptoms overall controlled with current medication dose declines need for counseling or medication adjustment.  Continue fluoxetine  - FLUoxetine (PROZAC) 20 MG capsule; Take 1 capsule (20 mg) by mouth daily  Dispense: 90 capsule; Refill: 3  - OFFICE/OUTPT VISIT,EST,LEVL IV    5. Thyroid nodule  Recent TSH normal.  Patient needs one-year follow-up ultrasound to ensure stability of nodules.  Denies current dysphagia/shortness of breath  - US Thyroid; Future  - OFFICE/OUTPT VISIT,EST,LEVL IV    6. Asymptomatic menopausal state   Previous biphosphonate therapy.  Currently on drug holiday.  Due for repeat DEXA  - DX Hip/Pelvis/Spine; Future    7. Special screening for malignant neoplasms, colon  Due for colonoscopy screening.  Denies any blood in the stools  -  "GASTROENTEROLOGY ADULT REF PROCEDURE ONLY Other; [facility] [phone]        End of Life Planning:  Patient currently has an advanced directive: Yes.  Practitioner is supportive of decision.    COUNSELING:  Reviewed preventive health counseling, as reflected in patient instructions    Estimated body mass index is 23.69 kg/m  as calculated from the following:    Height as of this encounter: 1.626 m (5' 4\").    Weight as of this encounter: 62.6 kg (138 lb).         reports that she quit smoking about 49 years ago. Her smoking use included cigarettes. She has a 2.50 pack-year smoking history. She has never used smokeless tobacco.      Appropriate preventive services were discussed with this patient, including applicable screening as appropriate for cardiovascular disease, diabetes, osteopenia/osteoporosis, and glaucoma.  As appropriate for age/gender, discussed screening for colorectal cancer, prostate cancer, breast cancer, and cervical cancer. Checklist reviewing preventive services available has been given to the patient.    Reviewed patients plan of care and provided an AVS. The Basic Care Plan (routine screening as documented in Health Maintenance) for Oliva meets the Care Plan requirement. This Care Plan has been established and reviewed with the Patient.    Counseling Resources:  ATP IV Guidelines  Pooled Cohorts Equation Calculator  Breast Cancer Risk Calculator  FRAX Risk Assessment  ICSI Preventive Guidelines  Dietary Guidelines for Americans, 2010  USDA's MyPlate  ASA Prophylaxis  Lung CA Screening      PLAN:  Continue current meds  Prescriptions refilled.    Call  [phone] or use [employer] to schedule a future lab appointment  fasting in 1 year.   Follow up with me a few days after these future labs are drawn to review results and other medical issues as necessary  Bone density test  [place]  Thyroid ultrasound - [place]   Colonoscopy  with  [facility]. They will call to schedule. If not heard " from them in 1 week, then call [phone].   Pt was informed regarding extra E&M billing for management of established medical issues not related to wellness visit        Mitch Traylor MD  [facility]

## 2019-07-06 ASSESSMENT — ANXIETY QUESTIONNAIRES: GAD7 TOTAL SCORE: 5

## 2019-07-29 ENCOUNTER — ANCILLARY PROCEDURE (OUTPATIENT)
Dept: BONE DENSITY | Facility: CLINIC | Age: 69
End: 2019-07-29
Attending: INTERNAL MEDICINE
Payer: MEDICARE

## 2019-07-29 ENCOUNTER — ANCILLARY PROCEDURE (OUTPATIENT)
Dept: ULTRASOUND IMAGING | Facility: CLINIC | Age: 69
End: 2019-07-29
Attending: INTERNAL MEDICINE
Payer: MEDICARE

## 2019-07-29 DIAGNOSIS — Z78.0 ASYMPTOMATIC MENOPAUSAL STATE: ICD-10-CM

## 2019-07-29 DIAGNOSIS — E04.1 THYROID NODULE: ICD-10-CM

## 2019-07-29 PROCEDURE — 77080 DXA BONE DENSITY AXIAL: CPT | Performed by: INTERNAL MEDICINE

## 2019-07-29 PROCEDURE — 76536 US EXAM OF HEAD AND NECK: CPT

## 2019-08-15 ENCOUNTER — MYC MEDICAL ADVICE (OUTPATIENT)
Dept: INTERNAL MEDICINE | Facility: CLINIC | Age: 69
End: 2019-08-15

## 2019-08-19 ENCOUNTER — TRANSFERRED RECORDS (OUTPATIENT)
Dept: HEALTH INFORMATION MANAGEMENT | Facility: CLINIC | Age: 69
End: 2019-08-19

## 2019-08-23 NOTE — TELEPHONE ENCOUNTER
Patient called requesting results for Thyroid US that was completed on 7/26/2019.    Please review and advise.     Radha HALL RN, BSN, PHN

## 2019-08-29 ENCOUNTER — MYC MEDICAL ADVICE (OUTPATIENT)
Dept: INTERNAL MEDICINE | Facility: CLINIC | Age: 69
End: 2019-08-29

## 2019-08-29 NOTE — TELEPHONE ENCOUNTER
PCP please see US Thyroid results, 7/29/2019 patient inquiring via MyChart.     Radha HALL RN, BSN, PHN

## 2019-08-30 NOTE — TELEPHONE ENCOUNTER
Spoke with pt. One nodule slightly larger. One smaller and one the same. Bx 1 year ago negative. Inform pt that I will  Speak with Endo next Wed (Dr Perkins) to discuss referring to him for opinion vs observation/repeat bx and will then update her further

## 2019-09-06 ENCOUNTER — DOCUMENTATION ONLY (OUTPATIENT)
Dept: OTHER | Facility: CLINIC | Age: 69
End: 2019-09-06

## 2019-09-08 ENCOUNTER — MYC MEDICAL ADVICE (OUTPATIENT)
Dept: INTERNAL MEDICINE | Facility: CLINIC | Age: 69
End: 2019-09-08

## 2019-09-08 DIAGNOSIS — E04.2 MULTIPLE THYROID NODULES: Primary | ICD-10-CM

## 2019-09-09 NOTE — TELEPHONE ENCOUNTER
PCP please advise if able to have consulted with Dr. Perkins regarding thyroid nodule.     Radha HALL RN, BSN, PHN

## 2019-09-13 PROBLEM — E04.2 MULTIPLE THYROID NODULES: Status: ACTIVE | Noted: 2019-09-13

## 2019-09-14 NOTE — TELEPHONE ENCOUNTER
Spoke with pt after discussion with Endo.  Bx from 1 year ago was to solid area of nodule #3 only. Both #1 and #3  Bigger on 2019 U/S thyroid  and both > 1.5cm. Will therefore have bx of solid nodule area for both #1 and #3. U/S guide bx order placed and told pt to call [phone] if  Has not heard from schedulers by early next week. Pt not taking any NSAIDS now

## 2019-09-18 ENCOUNTER — HOSPITAL ENCOUNTER (OUTPATIENT)
Facility: CLINIC | Age: 69
Discharge: HOME | End: 2019-09-18
Admitting: INTERNAL MEDICINE
Payer: MEDICARE

## 2019-09-18 ENCOUNTER — HOSPITAL ENCOUNTER (OUTPATIENT)
Dept: ULTRASOUND IMAGING | Facility: CLINIC | Age: 69
End: 2019-09-18
Attending: INTERNAL MEDICINE
Payer: MEDICARE

## 2019-09-18 VITALS
DIASTOLIC BLOOD PRESSURE: 74 MMHG | TEMPERATURE: 97.8 F | RESPIRATION RATE: 16 BRPM | OXYGEN SATURATION: 98 % | SYSTOLIC BLOOD PRESSURE: 135 MMHG

## 2019-09-18 DIAGNOSIS — E04.2 MULTIPLE THYROID NODULES: ICD-10-CM

## 2019-09-18 PROCEDURE — 25000125 ZZHC RX 250: Performed by: RADIOLOGY

## 2019-09-18 PROCEDURE — 88173 CYTOPATH EVAL FNA REPORT: CPT | Mod: 26,76

## 2019-09-18 PROCEDURE — 88173 CYTOPATH EVAL FNA REPORT: CPT

## 2019-09-18 PROCEDURE — 40000863 ZZH STATISTIC RADIOLOGY XRAY, US, CT, MAR, NM

## 2019-09-18 PROCEDURE — 10005 FNA BX W/US GDN 1ST LES: CPT

## 2019-09-18 RX ORDER — DEXTROSE MONOHYDRATE 25 G/50ML
25-50 INJECTION, SOLUTION INTRAVENOUS
Status: DISCONTINUED | OUTPATIENT
Start: 2019-09-18 | End: 2019-09-18 | Stop reason: HOSPADM

## 2019-09-18 RX ORDER — LIDOCAINE HYDROCHLORIDE 10 MG/ML
5 INJECTION, SOLUTION EPIDURAL; INFILTRATION; INTRACAUDAL; PERINEURAL ONCE
Status: COMPLETED | OUTPATIENT
Start: 2019-09-18 | End: 2019-09-18

## 2019-09-18 RX ORDER — NICOTINE POLACRILEX 4 MG
15-30 LOZENGE BUCCAL
Status: DISCONTINUED | OUTPATIENT
Start: 2019-09-18 | End: 2019-09-18 | Stop reason: HOSPADM

## 2019-09-18 RX ADMIN — LIDOCAINE HYDROCHLORIDE 5 ML: 10 INJECTION, SOLUTION EPIDURAL; INFILTRATION; INTRACAUDAL; PERINEURAL at 09:11

## 2019-09-18 NOTE — PROCEDURES
RADIOLOGY POST PROCEDURE NOTE    Patient name: Oliva Kearney  MRN: [id number]  : 1950    Pre-procedure diagnosis: right thyroid nodules.   Post-procedure diagnosis: Same    Procedure Date/Time: 2019  9:29 AM  Procedure: ultrasound guided thyroid nodule FNA>   Estimated blood loss: None  Specimen(s) collected with description: FNA samples.     The patient tolerated the procedure well with no immediate complications.  Significant findings:none    See imaging dictation for procedural details.    Provider name: Lizzy Boggs DO  Assistant(s):None

## 2019-09-18 NOTE — PROGRESS NOTES
Care Suites Admission Nursing Note    Reason for admission: thyroid FNA bx  CS arrival time: arrived to US dept for assessment at 0850  Accompanied by: none  Name/phone of DC : n/a  Medications held: n/a  Consent signed: with MD  Abnormal assessment/labs: n/a  If abnormal, provider notified: n/a  Education/questions answered: discharge instructions given pre proc, states understanding.  Plan: FNA bx

## 2019-09-18 NOTE — DISCHARGE INSTRUCTIONS
Thyroid/Lymph Node Biopsy Discharge Instructions     After you go home:      You may resume your normal diet    Care of Puncture Site:      You may have mild bruising, soreness & swelling at the puncture site. This will go away in a few days    For swelling & bruising, you may use an ice pack on the site.     Activity:      You may go back to your normal activity    Avoid strenuous activity for 24 hours    Medicines:      You may resume all medications    For minor pain, you may take Acetaminophen (Tylenol) or Ibuprofen (Advil)            Call the provider who ordered this test if:      Increased redness or swelling at the site    Fluid or blood oozing from the site    Severe pain at the site    Chills or a fever greater than 101 F (38 C).    Any questions or concerns    Call  911 or go to the Emergency Room if:      Trouble breathing    Your neck swells    Bleeding that you cannot control    Severe difficulty swallowing    If you have questions call:      [facility] Radiology Dept @ [phone]    The provider who performed your procedure was _________________.

## 2019-09-18 NOTE — PROGRESS NOTES
Care Suites Discharge Nursing Note    Education/questions answered: yes  Patient DC location: home  Accompanied by: none  CS discharge time: 0935    Band aid CDI to neck, no swelling.  No pain.  Pt had copy of discharge instr.  discharge to home in stable condition.

## 2019-09-20 ENCOUNTER — TELEPHONE (OUTPATIENT)
Dept: INTERNAL MEDICINE | Facility: CLINIC | Age: 69
End: 2019-09-20

## 2019-09-20 LAB — COPATH REPORT: NORMAL

## 2019-09-20 NOTE — TELEPHONE ENCOUNTER
Reason for Call:  Request for results:    Name of test or procedure: biopsy    Date of test of procedure: 9/18/19    Location of the test or procedure: [facility]    OK to leave the result message on voice mail or with a family member? YES    Phone number Patient can be reached at:  Cell number on file:    Telephone Information:   Mobile [phone]       Additional comments:     Call taken on 9/20/2019 at 3:54 PM by TALON WALSH

## 2019-10-01 ENCOUNTER — HEALTH MAINTENANCE LETTER (OUTPATIENT)
Age: 69
End: 2019-10-01

## 2020-06-22 ENCOUNTER — MYC REFILL (OUTPATIENT)
Dept: INTERNAL MEDICINE | Facility: CLINIC | Age: 70
End: 2020-06-22

## 2020-06-22 DIAGNOSIS — E78.5 HYPERLIPIDEMIA WITH TARGET LDL LESS THAN 130: ICD-10-CM

## 2020-06-23 RX ORDER — PRAVASTATIN SODIUM 40 MG
TABLET ORAL
Qty: 90 TABLET | Refills: 0 | Status: SHIPPED | OUTPATIENT
Start: 2020-06-23 | End: 2020-08-21

## 2020-07-01 ENCOUNTER — HOSPITAL ENCOUNTER (OUTPATIENT)
Dept: MAMMOGRAPHY | Facility: CLINIC | Age: 70
Discharge: HOME | End: 2020-07-01
Attending: INTERNAL MEDICINE | Admitting: INTERNAL MEDICINE
Payer: MEDICARE

## 2020-07-01 DIAGNOSIS — Z12.31 VISIT FOR SCREENING MAMMOGRAM: ICD-10-CM

## 2020-07-01 PROCEDURE — 77063 BREAST TOMOSYNTHESIS BI: CPT

## 2020-08-02 ENCOUNTER — MYC MEDICAL ADVICE (OUTPATIENT)
Dept: INTERNAL MEDICINE | Facility: CLINIC | Age: 70
End: 2020-08-02

## 2020-08-08 ENCOUNTER — MYC MEDICAL ADVICE (OUTPATIENT)
Dept: INTERNAL MEDICINE | Facility: CLINIC | Age: 70
End: 2020-08-08

## 2020-08-10 NOTE — TELEPHONE ENCOUNTER
MD would you be willing to order the labs for this pt ex:hepatic panel ? See my chart note .Leni Guzman RN

## 2020-08-15 NOTE — TELEPHONE ENCOUNTER
" This issues was regarding pt's christos Gonzales who has a separate med chart. See TE in his chart 8/10/20 re: issue that was addressed. This pt (pt's wife) educated by triage re: need to have requests re\": his care to come through his own chart and not hers  "

## 2020-08-21 ENCOUNTER — OFFICE VISIT (OUTPATIENT)
Dept: INTERNAL MEDICINE | Facility: CLINIC | Age: 70
End: 2020-08-21
Payer: MEDICARE

## 2020-08-21 VITALS
HEIGHT: 65 IN | TEMPERATURE: 98.4 F | WEIGHT: 140 LBS | SYSTOLIC BLOOD PRESSURE: 132 MMHG | DIASTOLIC BLOOD PRESSURE: 78 MMHG | OXYGEN SATURATION: 98 % | BODY MASS INDEX: 23.32 KG/M2 | RESPIRATION RATE: 16 BRPM | HEART RATE: 68 BPM

## 2020-08-21 DIAGNOSIS — E04.2 MULTIPLE THYROID NODULES: ICD-10-CM

## 2020-08-21 DIAGNOSIS — F41.1 ANXIETY STATE: ICD-10-CM

## 2020-08-21 DIAGNOSIS — E78.5 HYPERLIPIDEMIA WITH TARGET LDL LESS THAN 130: ICD-10-CM

## 2020-08-21 DIAGNOSIS — N39.3 FEMALE STRESS INCONTINENCE: ICD-10-CM

## 2020-08-21 DIAGNOSIS — M85.80 OSTEOPENIA, UNSPECIFIED LOCATION: ICD-10-CM

## 2020-08-21 DIAGNOSIS — Z00.00 MEDICARE ANNUAL WELLNESS VISIT, SUBSEQUENT: ICD-10-CM

## 2020-08-21 DIAGNOSIS — I10 ESSENTIAL HYPERTENSION: ICD-10-CM

## 2020-08-21 PROCEDURE — G0439 PPPS, SUBSEQ VISIT: HCPCS | Performed by: INTERNAL MEDICINE

## 2020-08-21 RX ORDER — LOSARTAN POTASSIUM AND HYDROCHLOROTHIAZIDE 12.5; 1 MG/1; MG/1
TABLET ORAL
Qty: 45 TABLET | Refills: 3 | Status: SHIPPED | OUTPATIENT
Start: 2020-08-21 | End: 2021-08-23

## 2020-08-21 RX ORDER — PRAVASTATIN SODIUM 40 MG
TABLET ORAL
Qty: 90 TABLET | Refills: 3 | Status: SHIPPED | OUTPATIENT
Start: 2020-08-21 | End: 2021-08-23

## 2020-08-21 ASSESSMENT — ANXIETY QUESTIONNAIRES
7. FEELING AFRAID AS IF SOMETHING AWFUL MIGHT HAPPEN: NOT AT ALL
1. FEELING NERVOUS, ANXIOUS, OR ON EDGE: SEVERAL DAYS
GAD7 TOTAL SCORE: 1
2. NOT BEING ABLE TO STOP OR CONTROL WORRYING: NOT AT ALL
IF YOU CHECKED OFF ANY PROBLEMS ON THIS QUESTIONNAIRE, HOW DIFFICULT HAVE THESE PROBLEMS MADE IT FOR YOU TO DO YOUR WORK, TAKE CARE OF THINGS AT HOME, OR GET ALONG WITH OTHER PEOPLE: NOT DIFFICULT AT ALL
3. WORRYING TOO MUCH ABOUT DIFFERENT THINGS: NOT AT ALL
6. BECOMING EASILY ANNOYED OR IRRITABLE: NOT AT ALL
5. BEING SO RESTLESS THAT IT IS HARD TO SIT STILL: NOT AT ALL

## 2020-08-21 ASSESSMENT — PATIENT HEALTH QUESTIONNAIRE - PHQ9: 5. POOR APPETITE OR OVEREATING: NOT AT ALL

## 2020-08-21 ASSESSMENT — MIFFLIN-ST. JEOR: SCORE: 1142.98

## 2020-08-21 ASSESSMENT — ACTIVITIES OF DAILY LIVING (ADL): CURRENT_FUNCTION: NO ASSISTANCE NEEDED

## 2020-08-21 NOTE — PROGRESS NOTES
"SUBJECTIVE:   Oliva Kearney is a 70 year old female who presents for Preventive Visit.  Are you in the first 12 months of your Medicare coverage?  No    Healthy Habits:     In general, how would you rate your overall health?  Excellent    Frequency of exercise:  6-7 days/week    Duration of exercise:  30-45 minutes    Do you usually eat at least 4 servings of fruit and vegetables a day, include whole grains    & fiber and avoid regularly eating high fat or \"junk\" foods?  Yes    Taking medications regularly:  Yes    Barriers to taking medications:  None    Medication side effects:  None    Ability to successfully perform activities of daily living:  No assistance needed    Home Safety:  No safety concerns identified    Hearing Impairment:  No hearing concerns    In the past 6 months, have you been bothered by leaking of urine? Yes (When coughing or sneezing)    In general, how would you rate your overall mental or emotional health?  Very good      PHQ-2 Total Score: 0    Additional concerns today:  Yes (Thyroid)    Do you feel safe in your environment? Yes    Have you ever done Advance Care Planning? (For example, a Health Directive, POLST, or a discussion with a medical provider or your loved ones about your wishes): Yes, advance care planning is on file.      Fall risk  Fallen 2 or more times in the past year?: No  Any fall with injury in the past year?: No    Cognitive Screening   1) Repeat 3 items (Leader, Season, Table)    2) Clock draw: NORMAL  3) 3 item recall: Recalls 3 objects  Results: NORMAL clock, 3 items recalled: COGNITIVE IMPAIRMENT LESS LIKELY    Mini-CogTM Copyright ARMEN Mckay. Licensed by the author for use in [facility]; reprinted with permission ([email or web address]). All rights reserved.      Do you have sleep apnea, excessive snoring or daytime drowsiness?: no    Reviewed and updated as needed this visit by clinical staff  Allergies         Reviewed and updated as needed this visit " by Provider        Social History     Tobacco Use     Smoking status: Former Smoker     Packs/day: 0.25     Years: 10.00     Pack years: 2.50     Types: Cigarettes     Last attempt to quit: 10/26/1969     Years since quittin.8     Smokeless tobacco: Never Used     Tobacco comment: light cigarette use   Substance Use Topics     Alcohol use: Yes     Comment: 2x week     If you drink alcohol do you typically have >3 drinks per day or >7 drinks per week? Yes    Alcohol Use 2019   Prescreen: >3 drinks/day or >7 drinks/week? -   Prescreen: >3 drinks/day or >7 drinks/week? Yes     AUDIT - Alcohol Use Disorders Identification Test - Reproduced from the World Health Organization Audit 2001 (Second Edition) 2020   1.  How often do you have a drink containing alcohol? 4 or more times a week   2.  How many drinks containing alcohol do you have on a typical day when you are drinking? 1 or 2   3.  How often do you have five or more drinks on one occasion? Never   4.  How often during the last year have you found that you were not able to stop drinking once you had started? Never   5.  How often during the last year have you failed to do what was normally expected of you because of drinking? Never   6.  How often during the last year have you needed a first drink in the morning to get yourself going after a heavy drinking session? Never   7.  How often during the last year have you had a feeling of guilt or remorse after drinking? Never   8.  How often during the last year have you been unable to remember what happened the night before because of your drinking? Never   9.  Have you or someone else been injured because of your drinking? No   10. Has a relative, friend, doctor or other health care worker been concerned about your drinking or suggested you cut down? No   TOTAL SCORE 4         Current providers sharing in care for this patient include:   Patient Care Team:  Mitch Traylor MD as PCP - General  Mitch Traylor  "MD JOSE as Assigned PCP    The following health maintenance items are reviewed in Epic and correct as of today:  Health Maintenance   Topic Date Due     PHQ-2  01/01/2020     MEDICARE ANNUAL WELLNESS VISIT  07/05/2020     FALL RISK ASSESSMENT  07/05/2020     INFLUENZA VACCINE (1) 09/01/2020     MAMMO SCREENING  07/01/2022     DEXA  07/29/2022     LIPID  07/03/2024     COLORECTAL CANCER SCREENING  08/19/2024     ADVANCE CARE PLANNING  09/06/2024     DTAP/TDAP/TD IMMUNIZATION (3 - Td) 05/11/2026     HEPATITIS C SCREENING  Completed     PNEUMOCOCCAL IMMUNIZATION 65+ LOW/MEDIUM RISK  Completed     ZOSTER IMMUNIZATION  Completed     IPV IMMUNIZATION  Aged Out     MENINGITIS IMMUNIZATION  Aged Out     HEPATITIS B IMMUNIZATION  Aged Out     Labs reviewed in EPIC      Review of Systems  CONSTITUTIONAL: NEGATIVE for fever, chills. Weight up 4 pounds in 2 years  INTEGUMENTARY/SKIN: NEGATIVE for worrisome rashes, moles or lesions  EYES: NEGATIVE for vision changes or irritation  ENT/MOUTH: NEGATIVE for ear, mouth and throat problems  RESP: NEGATIVE for significant cough or SOB  BREAST: NEGATIVE for masses, tenderness or discharge  CV: NEGATIVE for chest pain, palpitations or peripheral edema  GI: NEGATIVE for nausea, abdominal pain, heartburn, or change in bowel habits  : NEGATIVE for frequency, dysuria, or hematuria. Rare stress incontinence with severe sneeze. No urge issues   MUSCULOSKELETAL: NEGATIVE for significant arthralgias or myalgia except for arthritis in hands. Getting steroid injections in joints of hands. Followed by [facility]  NEURO: NEGATIVE for weakness, dizziness or paresthesias  ENDOCRINE: NEGATIVE for temperature intolerance. On statin therapy. Hx benign thyroid nodules  HEME: NEGATIVE for bleeding problems  PSYCHIATRIC: NEGATIVE for changes in mood or affect with meds. MARCOS  = 1    OBJECTIVE:   /78   Pulse 68   Temp 98.4  F (36.9  C) (Temporal)   Resp 16   Ht 1.638 m (5' 4.5\")   Wt 63.5 kg (140 lb)   " "SpO2 98%   BMI 23.66 kg/m   Estimated body mass index is 23.66 kg/m  as calculated from the following:    Height as of this encounter: 1.638 m (5' 4.5\").    Weight as of this encounter: 63.5 kg (140 lb).  Physical Exam  General appearance - healthy, alert, no distress  Skin - No rashes or lesions.  Head - normocephalic, atraumatic  Eyes - MANDO, EOMI, fundi exam with nondilated pupils negative.  Ears - External ears normal. Canals clear. TM's normal.  Nose/Sinuses - Nares normal. Septum midline. Mucosa normal. No drainage or sinus tenderness.  Oropharynx - No erythema, no adenopathy, no exudates.  Neck - Supple without adenopathy.  No bruits. Thyroid nodules palpable right side similar in size to previous exam  Lungs - Clear to auscultation without wheezes/rhonchi.  Heart - Regular rate and rhythm without murmurs, clicks, or gallops.  Nodes - No supraclavicular, axillary, or inguinal adenopathy palpable.  Breasts - deferred  Abdomen - Abdomen soft, non-tender. BS normal. No masses or hepatosplenomegaly palpable. No bruits.  Extremities -No cyanosis, clubbing or edema.    Musculoskeletal - Spine ROM normal. Muscular strength intact.  DIP and PIP joints hands with osteoarthritis node changes. No erythema/increased warmth  Peripheral pulses - radial=4/4, femoral=4/4, posterior tibial=4/4, dorsalis pedis=4/4,  Neuro - Gait normal. Reflexes normal and symmetric. Sensation grossly WNL.  Genital/Rectal - deferred        ASSESSMENT / PLAN:   1. Medicare annual wellness visit, subsequent  See below for healthcare maintenance discussion.  Otherwise up-to-date.    2. Anxiety state  Controlled.  Continue current medication.  - FLUoxetine (PROZAC) 20 MG capsule; Take 1 capsule (20 mg) by mouth daily  Dispense: 90 capsule; Refill: 3    3. Essential hypertension  Controlled.  Continue current medication. Has CMP lab ordered  - losartan-hydrochlorothiazide (HYZAAR) 100-12.5 MG tablet; TAKE 1/2 TABLET EVERY DAY  Dispense: 45 " "tablet; Refill: 3    4. Hyperlipidemia with target LDL less than 130  Previous control.  Continue current medication.  Fasting lipids in the next 1 to 2 weeks as previously ordered  - pravastatin (PRAVACHOL) 40 MG tablet; TAKE 1 TABLET EVERY DAY  Dispense: 90 tablet; Refill: 3    5. Osteopenia, unspecified location  DEXA July 2019. Repeat 3 years (previous [facility] treatment)    6. Multiple thyroid nodules  Thyroid nodule biopsies previously negative.  No symptoms.  Previous thyroid function normal.  Lab follow-up as ordered  - TSH with free T4 reflex; Future    7. Female stress incontinence  Symptoms minimal and not bothersome to the patient.  Will try doing daily Kegel exercises      COUNSELING:  Reviewed preventive health counseling, as reflected in patient instructions    Estimated body mass index is 23.69 kg/m  as calculated from the following:    Height as of 7/5/19: 1.626 m (5' 4\").    Weight as of 7/5/19: 62.6 kg (138 lb).         reports that she quit smoking about 50 years ago. Her smoking use included cigarettes. She has a 2.50 pack-year smoking history. She has never used smokeless tobacco.      Appropriate preventive services were discussed with this patient, including applicable screening as appropriate for cardiovascular disease, diabetes, osteopenia/osteoporosis, and glaucoma.  As appropriate for age/gender, discussed screening for colorectal cancer, prostate cancer, breast cancer, and cervical cancer. Checklist reviewing preventive services available has been given to the patient.    Reviewed patients plan of care and provided an AVS. The Basic Care Plan (routine screening as documented in Health Maintenance) for Oliva meets the Care Plan requirement. This Care Plan has been established and reviewed with the Patient.    Counseling Resources:  ATP IV Guidelines  Pooled Cohorts Equation Calculator  Breast Cancer Risk Calculator  FRAX Risk Assessment  ICSI Preventive Guidelines  Dietary Guidelines " for Americans, 2010  USDA's MyPlate  ASA Prophylaxis  Lung CA Screening      PLAN:  Continue current meds  Prescriptions refilled on file.    Call  [phone] or use [facility] to schedule a future lab appointment  fasting in 1-2 weeks.   For fasting labs, please refrain from eating for 8 hours or more.   Drink 2 glasses of water before your lab appointment. It is fine to take your  oral medications on the morning of the lab test as usual  I would recommend you receive an influenza (flu) vaccine  this Fall (September or October)  Kegel exercises for mild urinary stress incontinence symptoms  Pt was informed regarding extra E&M billing for management of new or established medical issues not related to today's wellness visit    Mitch Traylor MD  [facility]

## 2020-08-21 NOTE — PATIENT INSTRUCTIONS
Continue current meds  Prescriptions refilled on file.    Call  [phone] or use [facility] to schedule a future lab appointment  fasting in 1-2 weeks.   For fasting labs, please refrain from eating for 8 hours or more.   Drink 2 glasses of water before your lab appointment. It is fine to take your  oral medications on the morning of the lab test as usual  I would recommend you receive an influenza (flu) vaccine  this Fall (September or October)  Kegel exercises for mild urinary stress incontinence symptoms  Pt was informed regarding extra E&M billing for management of new or established medical issues not related to today's wellness visit

## 2020-08-22 ASSESSMENT — ANXIETY QUESTIONNAIRES: GAD7 TOTAL SCORE: 1

## 2020-08-23 PROBLEM — N39.3 FEMALE STRESS INCONTINENCE: Status: ACTIVE | Noted: 2020-08-23

## 2020-08-24 DIAGNOSIS — E78.5 HYPERLIPIDEMIA WITH TARGET LDL LESS THAN 130: ICD-10-CM

## 2020-08-24 DIAGNOSIS — I10 ESSENTIAL HYPERTENSION: ICD-10-CM

## 2020-08-24 DIAGNOSIS — E04.2 MULTIPLE THYROID NODULES: ICD-10-CM

## 2020-08-24 LAB
ALBUMIN SERPL-MCNC: 3.8 G/DL (ref 3.4–5)
ALP SERPL-CCNC: 67 U/L (ref 40–150)
ALT SERPL W P-5'-P-CCNC: 28 U/L (ref 0–50)
ANION GAP SERPL CALCULATED.3IONS-SCNC: 6 MMOL/L (ref 3–14)
AST SERPL W P-5'-P-CCNC: 21 U/L (ref 0–45)
BILIRUB SERPL-MCNC: 0.7 MG/DL (ref 0.2–1.3)
BUN SERPL-MCNC: 18 MG/DL (ref 7–30)
CALCIUM SERPL-MCNC: 9.2 MG/DL (ref 8.5–10.1)
CHLORIDE SERPL-SCNC: 104 MMOL/L (ref 94–109)
CHOLEST SERPL-MCNC: 210 MG/DL
CO2 SERPL-SCNC: 26 MMOL/L (ref 20–32)
CREAT SERPL-MCNC: 0.7 MG/DL (ref 0.52–1.04)
GFR SERPL CREATININE-BSD FRML MDRD: 86 ML/MIN/{1.73_M2}
GLUCOSE SERPL-MCNC: 89 MG/DL (ref 70–99)
HDLC SERPL-MCNC: 94 MG/DL
LDLC SERPL CALC-MCNC: 102 MG/DL
NONHDLC SERPL-MCNC: 116 MG/DL
POTASSIUM SERPL-SCNC: 4 MMOL/L (ref 3.4–5.3)
PROT SERPL-MCNC: 6.8 G/DL (ref 6.8–8.8)
SODIUM SERPL-SCNC: 136 MMOL/L (ref 133–144)
TRIGL SERPL-MCNC: 72 MG/DL
TSH SERPL DL<=0.005 MIU/L-ACNC: 3.29 MU/L (ref 0.4–4)

## 2020-08-24 PROCEDURE — 80053 COMPREHEN METABOLIC PANEL: CPT | Performed by: INTERNAL MEDICINE

## 2020-08-24 PROCEDURE — 80061 LIPID PANEL: CPT | Performed by: INTERNAL MEDICINE

## 2020-08-24 PROCEDURE — 84443 ASSAY THYROID STIM HORMONE: CPT | Performed by: INTERNAL MEDICINE

## 2020-08-24 PROCEDURE — 36415 COLL VENOUS BLD VENIPUNCTURE: CPT | Performed by: INTERNAL MEDICINE

## 2021-01-15 ENCOUNTER — HEALTH MAINTENANCE LETTER (OUTPATIENT)
Age: 71
End: 2021-01-15

## 2021-07-19 ENCOUNTER — TELEPHONE (OUTPATIENT)
Dept: INTERNAL MEDICINE | Facility: CLINIC | Age: 71
End: 2021-07-19

## 2021-07-19 DIAGNOSIS — I10 ESSENTIAL HYPERTENSION: ICD-10-CM

## 2021-07-19 DIAGNOSIS — E78.5 HYPERLIPIDEMIA WITH TARGET LDL LESS THAN 130: Primary | ICD-10-CM

## 2021-07-19 NOTE — TELEPHONE ENCOUNTER
Reason for Call: Request for an order or referral:    Order or referral being requested: Would like lab orders for blood work for physical     Date needed: 8/23/21    Has the patient been seen by the PCP for this problem? YES    Additional comments:     Phone number Patient can be reached at:  Home number on file [phone] (home)    Best Time:      Can we leave a detailed message on this number?  Not Applicable    Call taken on 7/19/2021 at 12:03 PM by Tong Garcia

## 2021-07-28 ENCOUNTER — HOSPITAL ENCOUNTER (OUTPATIENT)
Dept: MAMMOGRAPHY | Facility: CLINIC | Age: 71
Discharge: HOME | End: 2021-07-28
Attending: INTERNAL MEDICINE | Admitting: INTERNAL MEDICINE
Payer: COMMERCIAL

## 2021-07-28 DIAGNOSIS — Z12.31 VISIT FOR SCREENING MAMMOGRAM: ICD-10-CM

## 2021-07-28 PROCEDURE — 77067 SCR MAMMO BI INCL CAD: CPT

## 2021-08-16 ENCOUNTER — LAB (OUTPATIENT)
Dept: LAB | Facility: CLINIC | Age: 71
End: 2021-08-16
Payer: COMMERCIAL

## 2021-08-16 DIAGNOSIS — E78.5 HYPERLIPIDEMIA WITH TARGET LDL LESS THAN 130: ICD-10-CM

## 2021-08-16 DIAGNOSIS — I10 ESSENTIAL HYPERTENSION: ICD-10-CM

## 2021-08-16 LAB
ALBUMIN SERPL-MCNC: 3.7 G/DL (ref 3.4–5)
ALP SERPL-CCNC: 67 U/L (ref 40–150)
ALT SERPL W P-5'-P-CCNC: 29 U/L (ref 0–50)
ANION GAP SERPL CALCULATED.3IONS-SCNC: 3 MMOL/L (ref 3–14)
AST SERPL W P-5'-P-CCNC: 22 U/L (ref 0–45)
BILIRUB SERPL-MCNC: 0.9 MG/DL (ref 0.2–1.3)
BUN SERPL-MCNC: 16 MG/DL (ref 7–30)
CALCIUM SERPL-MCNC: 9.2 MG/DL (ref 8.5–10.1)
CHLORIDE BLD-SCNC: 104 MMOL/L (ref 94–109)
CHOLEST SERPL-MCNC: 213 MG/DL
CO2 SERPL-SCNC: 28 MMOL/L (ref 20–32)
CREAT SERPL-MCNC: 0.74 MG/DL (ref 0.52–1.04)
ERYTHROCYTE [DISTWIDTH] IN BLOOD BY AUTOMATED COUNT: 12.7 % (ref 10–15)
FASTING STATUS PATIENT QL REPORTED: YES
GFR SERPL CREATININE-BSD FRML MDRD: 81 ML/MIN/1.73M2
GLUCOSE BLD-MCNC: 90 MG/DL (ref 70–99)
HCT VFR BLD AUTO: 37.5 % (ref 35–47)
HDLC SERPL-MCNC: 94 MG/DL
HGB BLD-MCNC: 12.6 G/DL (ref 11.7–15.7)
LDLC SERPL CALC-MCNC: 102 MG/DL
MCH RBC QN AUTO: 33.2 PG (ref 26.5–33)
MCHC RBC AUTO-ENTMCNC: 33.6 G/DL (ref 31.5–36.5)
MCV RBC AUTO: 99 FL (ref 78–100)
NONHDLC SERPL-MCNC: 119 MG/DL
PLATELET # BLD AUTO: 211 10E3/UL (ref 150–450)
POTASSIUM BLD-SCNC: 3.7 MMOL/L (ref 3.4–5.3)
PROT SERPL-MCNC: 6.7 G/DL (ref 6.8–8.8)
RBC # BLD AUTO: 3.79 10E6/UL (ref 3.8–5.2)
SODIUM SERPL-SCNC: 135 MMOL/L (ref 133–144)
TRIGL SERPL-MCNC: 84 MG/DL
WBC # BLD AUTO: 5.3 10E3/UL (ref 4–11)

## 2021-08-16 PROCEDURE — 85027 COMPLETE CBC AUTOMATED: CPT

## 2021-08-16 PROCEDURE — 36415 COLL VENOUS BLD VENIPUNCTURE: CPT

## 2021-08-16 PROCEDURE — 80053 COMPREHEN METABOLIC PANEL: CPT

## 2021-08-16 PROCEDURE — 80061 LIPID PANEL: CPT

## 2021-08-18 ASSESSMENT — ENCOUNTER SYMPTOMS
ABDOMINAL PAIN: 0
ARTHRALGIAS: 1
COUGH: 0
DIZZINESS: 0
BREAST MASS: 0
PALPITATIONS: 0
NERVOUS/ANXIOUS: 0
CONSTIPATION: 0
HEARTBURN: 0
FREQUENCY: 0
PARESTHESIAS: 0
FEVER: 0
EYE PAIN: 0
CHILLS: 0
SHORTNESS OF BREATH: 0
WEAKNESS: 0
HEMATURIA: 0
HEMATOCHEZIA: 0
DYSURIA: 0
HEADACHES: 0
JOINT SWELLING: 1
NAUSEA: 0
MYALGIAS: 0
DIARRHEA: 0
SORE THROAT: 0

## 2021-08-18 ASSESSMENT — ACTIVITIES OF DAILY LIVING (ADL): CURRENT_FUNCTION: NO ASSISTANCE NEEDED

## 2021-08-23 ENCOUNTER — OFFICE VISIT (OUTPATIENT)
Dept: INTERNAL MEDICINE | Facility: CLINIC | Age: 71
End: 2021-08-23
Payer: COMMERCIAL

## 2021-08-23 VITALS
OXYGEN SATURATION: 94 % | WEIGHT: 131 LBS | SYSTOLIC BLOOD PRESSURE: 136 MMHG | RESPIRATION RATE: 16 BRPM | TEMPERATURE: 98.2 F | HEIGHT: 65 IN | DIASTOLIC BLOOD PRESSURE: 70 MMHG | HEART RATE: 66 BPM | BODY MASS INDEX: 21.83 KG/M2

## 2021-08-23 DIAGNOSIS — E04.2 MULTIPLE THYROID NODULES: ICD-10-CM

## 2021-08-23 DIAGNOSIS — B00.9 HERPES SIMPLEX INFECTION OF SKIN: ICD-10-CM

## 2021-08-23 DIAGNOSIS — E78.5 HYPERLIPIDEMIA WITH TARGET LDL LESS THAN 130: ICD-10-CM

## 2021-08-23 DIAGNOSIS — F41.1 ANXIETY STATE: ICD-10-CM

## 2021-08-23 DIAGNOSIS — I10 ESSENTIAL HYPERTENSION: ICD-10-CM

## 2021-08-23 DIAGNOSIS — Z00.00 MEDICARE ANNUAL WELLNESS VISIT, SUBSEQUENT: ICD-10-CM

## 2021-08-23 DIAGNOSIS — M19.91 PRIMARY OSTEOARTHRITIS, UNSPECIFIED SITE: ICD-10-CM

## 2021-08-23 DIAGNOSIS — M85.80 OSTEOPENIA, UNSPECIFIED LOCATION: ICD-10-CM

## 2021-08-23 PROCEDURE — 99397 PER PM REEVAL EST PAT 65+ YR: CPT | Performed by: INTERNAL MEDICINE

## 2021-08-23 PROCEDURE — 99214 OFFICE O/P EST MOD 30 MIN: CPT | Mod: 25 | Performed by: INTERNAL MEDICINE

## 2021-08-23 RX ORDER — LOSARTAN POTASSIUM AND HYDROCHLOROTHIAZIDE 12.5; 1 MG/1; MG/1
TABLET ORAL
Qty: 45 TABLET | Refills: 3 | Status: SHIPPED | OUTPATIENT
Start: 2021-08-23 | End: 2021-10-01

## 2021-08-23 RX ORDER — PRAVASTATIN SODIUM 40 MG
TABLET ORAL
Qty: 90 TABLET | Refills: 3 | Status: SHIPPED | OUTPATIENT
Start: 2021-08-23 | End: 2021-10-29

## 2021-08-23 RX ORDER — VALACYCLOVIR HYDROCHLORIDE 500 MG/1
500 TABLET, FILM COATED ORAL 2 TIMES DAILY
Qty: 30 TABLET | Refills: 0 | Status: SHIPPED | OUTPATIENT
Start: 2021-08-23 | End: 2023-07-18

## 2021-08-23 ASSESSMENT — ANXIETY QUESTIONNAIRES
1. FEELING NERVOUS, ANXIOUS, OR ON EDGE: NOT AT ALL
7. FEELING AFRAID AS IF SOMETHING AWFUL MIGHT HAPPEN: NOT AT ALL
GAD7 TOTAL SCORE: 0
IF YOU CHECKED OFF ANY PROBLEMS ON THIS QUESTIONNAIRE, HOW DIFFICULT HAVE THESE PROBLEMS MADE IT FOR YOU TO DO YOUR WORK, TAKE CARE OF THINGS AT HOME, OR GET ALONG WITH OTHER PEOPLE: NOT DIFFICULT AT ALL
2. NOT BEING ABLE TO STOP OR CONTROL WORRYING: NOT AT ALL
5. BEING SO RESTLESS THAT IT IS HARD TO SIT STILL: NOT AT ALL
6. BECOMING EASILY ANNOYED OR IRRITABLE: NOT AT ALL
3. WORRYING TOO MUCH ABOUT DIFFERENT THINGS: NOT AT ALL

## 2021-08-23 ASSESSMENT — ENCOUNTER SYMPTOMS
NERVOUS/ANXIOUS: 0
WEAKNESS: 0
CHILLS: 0
COUGH: 0
DIZZINESS: 0
ARTHRALGIAS: 1
EYE PAIN: 0
JOINT SWELLING: 1
BREAST MASS: 0
CONSTIPATION: 0
PALPITATIONS: 0
HEMATOCHEZIA: 0
FREQUENCY: 0
HEMATURIA: 0
HEADACHES: 0
NAUSEA: 0
MYALGIAS: 0
PARESTHESIAS: 0
ABDOMINAL PAIN: 0
HEARTBURN: 0
DYSURIA: 0
SHORTNESS OF BREATH: 0
SORE THROAT: 0
FEVER: 0
DIARRHEA: 0

## 2021-08-23 ASSESSMENT — ACTIVITIES OF DAILY LIVING (ADL): CURRENT_FUNCTION: NO ASSISTANCE NEEDED

## 2021-08-23 ASSESSMENT — MIFFLIN-ST. JEOR: SCORE: 1097.15

## 2021-08-23 ASSESSMENT — PATIENT HEALTH QUESTIONNAIRE - PHQ9: 5. POOR APPETITE OR OVEREATING: NOT AT ALL

## 2021-08-23 NOTE — PATIENT INSTRUCTIONS
Continue current meds  Prescriptions refilled.    Continue diet/exercise  Call  [phone] or use [facility] to schedule a future lab appointment  fasting in 1 year.   For fasting labs, please refrain from eating for 8 hours or more.   Drink 2 glasses of water before your lab appointment. It is fine to take your  oral medications on the morning of the lab test as usual  Schedule a follow up appointment with me in clinic a few days after these future labs are drawn to review results and other medical issues as necessary  Valcyclovir 500mg tab, 1 tab twice a day for 3 days per flare of painful skin  Rash in buttock cream area  Bone density (DEXA) in 1 year. This will be done at the [facility]. Call [phone] or use [facility] to schedule.   Thyroid ultrasound in 1 year. Will arrange at follow-up appt  Check records to see if imaging of the abdomina aorta done before age 64  I would recommend you receive an influenza (flu) vaccine  this Fall (October or early November)  Probable covid vaccine booster shot 8 months after your previous vaccine series. Will await further guidance from the CDC in September  Pt was informed regarding extra E&M billing for management of new or established medical issues not related to today's wellness visit

## 2021-08-23 NOTE — PROGRESS NOTES
"SUBJECTIVE:   Oliva Kearney is a 71 year old female who presents for Preventive Visit.      Patient has been advised of split billing requirements and indicates understanding: Yes   Are you in the first 12 months of your Medicare coverage?  No    Healthy Habits:     In general, how would you rate your overall health?  Excellent    Frequency of exercise:  4-5 days/week    Duration of exercise:  30-45 minutes    Do you usually eat at least 4 servings of fruit and vegetables a day, include whole grains    & fiber and avoid regularly eating high fat or \"junk\" foods?  Yes    Taking medications regularly:  Yes    Medication side effects:  Not applicable    Ability to successfully perform activities of daily living:  No assistance needed    Home Safety:  Lack of grab bars in the bathroom    Hearing Impairment:  No hearing concerns    In the past 6 months, have you been bothered by leaking of urine? Yes    In general, how would you rate your overall mental or emotional health?  Excellent      PHQ-2 Total Score: 0    Additional concerns today:  No    Do you feel safe in your environment? Yes    Have you ever done Advance Care Planning? (For example, a Health Directive, POLST, or a discussion with a medical provider or your loved ones about your wishes): Yes, advance care planning is on file.       Fall risk   No falls within the last year and no fall with injury    Cognitive Screening   1) Repeat 3 items (Leader, Season, Table)    2) Clock draw: NORMAL  3) 3 item recall: Recalls 3 objects  Results: NORMAL clock, 3 items recalled: COGNITIVE IMPAIRMENT LESS LIKELY    Mini-CogTM Copyright ARMEN Mckay. Licensed by the author for use in [facility]; reprinted with permission ([email or web address]). All rights reserved.      Do you have sleep apnea, excessive snoring or daytime drowsiness?: no    Reviewed and updated as needed this visit by clinical staff                 Reviewed and updated as needed this visit by " Provider                Social History     Tobacco Use     Smoking status: Former Smoker     Packs/day: 0.25     Years: 10.00     Pack years: 2.50     Types: Cigarettes     Quit date: 10/26/1969     Years since quittin.8     Smokeless tobacco: Never Used     Tobacco comment: light cigarette use   Substance Use Topics     Alcohol use: Yes     Comment: 2x week     If you drink alcohol do you typically have >3 drinks per day or >7 drinks per week? Yes      Alcohol Use 2021   Prescreen: >3 drinks/day or >7 drinks/week? No   Prescreen: >3 drinks/day or >7 drinks/week? -   AUDIT SCORE  -     AUDIT - Alcohol Use Disorders Identification Test - Reproduced from the World Health Organization Audit 2001 (Second Edition) 2021   1.  How often do you have a drink containing alcohol? 4 or more times a week   2.  How many drinks containing alcohol do you have on a typical day when you are drinking? 1 or 2   3.  How often do you have five or more drinks on one occasion? Never   4.  How often during the last year have you found that you were not able to stop drinking once you had started? Never   5.  How often during the last year have you failed to do what was normally expected of you because of drinking? Never   6.  How often during the last year have you needed a first drink in the morning to get yourself going after a heavy drinking session? Never   7.  How often during the last year have you had a feeling of guilt or remorse after drinking? Never   8.  How often during the last year have you been unable to remember what happened the night before because of your drinking? Never   9.  Have you or someone else been injured because of your drinking? No   10. Has a relative, friend, doctor or other health care worker been concerned about your drinking or suggested you cut down? No   TOTAL SCORE 4               Current providers sharing in care for this patient include:   Patient Care Team:  Mitch Traylor MD as PCP -    VeMitch helm MD as Assigned PCP    The following health maintenance items are reviewed in Epic and correct as of today:  Health Maintenance Due   Topic Date Due     ANNUAL REVIEW OF HM ORDERS  Never done     COVID-19 Vaccine (1) Never done     FALL RISK ASSESSMENT  08/21/2021     MEDICARE ANNUAL WELLNESS VISIT  08/21/2021     Labs reviewed in EPIC      Review of Systems   Constitutional: Negative for chills and fever.   HENT: Negative for congestion, ear pain, hearing loss and sore throat.    Eyes: Negative for pain and visual disturbance.   Respiratory: Negative for cough and shortness of breath.    Cardiovascular: Negative for chest pain, palpitations and peripheral edema.   Gastrointestinal: Negative for abdominal pain, constipation, diarrhea, heartburn, hematochezia and nausea.   Breasts:  Negative for tenderness, breast mass and discharge.   Genitourinary: Negative for dysuria, frequency, hematuria, pelvic pain, urgency, vaginal bleeding and vaginal discharge.   Musculoskeletal: Positive for arthralgias and joint swelling. Negative for myalgias.   Skin: Negative for rash.   Neurological: Negative for dizziness, weakness, headaches and paresthesias.   Psychiatric/Behavioral: Negative for mood changes. The patient is not nervous/anxious.       Weight down 9 pounds with diet   Gets blistering rash on upper buttock crease about once a year and last for  1  week and painful. Last about April 2021. Always in same spot  Hx osteoarthritis hands/fingers. Getting  Cortisone injections through ortho  Denies chest pain, shortness of breath, abdominal pain, headache, vision changes or side effects with medications.  Anxiety well controlled with use of fluoxetine. MARCOS = 0  On statin therapy.  LDL minimally elevated at 102.  Total cholesterol elevated but this is because of the large amount of good HDL cholesterol.  Triglycerides less than 100 so LDL much less than the risk for CAD.  History of thyroid nodules.   "Ultrasound last done 2 years ago.  Underwent thyroid nodule biopsies which were benign.  Denies any swallowing issues   Family history of  AAA in patient's mother.  Patient thinks she has had some imaging done of the aorta in the past but does not remember exactly when.  Will be checking records at home    OBJECTIVE:   /70   Pulse 66   Temp 98.2  F (36.8  C) (Temporal)   Resp 16   Ht 1.638 m (5' 4.5\")   Wt 59.4 kg (131 lb)   SpO2 94%   BMI 22.14 kg/m   Estimated body mass index is 23.66 kg/m  as calculated from the following:    Height as of 8/21/20: 1.638 m (5' 4.5\").    Weight as of 8/21/20: 63.5 kg (140 lb).  Physical Exam  General appearance - healthy, alert, no distress. Normal affect  Skin - No rashes or lesions.  Head - normocephalic, atraumatic  Eyes - MANDO, EOMI, fundi exam with nondilated pupils negative.  Ears - External ears normal. Canals clear. TM's normal.  Nose/Sinuses - Nares normal. Septum midline. Mucosa normal. No drainage or sinus tenderness.  Oropharynx - No erythema, no adenopathy, no exudates.  Neck - Supple without adenopathy. No bruits. Thyroid nodules palpable right side similar in size to previous exam  Lungs - Clear to auscultation without wheezes/rhonchi.  Heart - Regular rate and rhythm without murmurs, clicks, or gallops.  Nodes - No supraclavicular, axillary, or inguinal adenopathy palpable.  Breasts - deferred  Abdomen - Abdomen soft, non-tender. BS normal. No masses or hepatosplenomegaly palpable. No bruits. No AAA palpable  Extremities -No cyanosis, clubbing or edema.    Musculoskeletal - Spine ROM normal. Muscular strength intact.  DIP and PIP joints hands with osteoarthritis changes and thickening bilaterally.  No acute warmth/erythema  Peripheral pulses - radial=4/4, femoral=4/4, posterior tibial=4/4, dorsalis pedis=4/4,  Neuro - Gait normal. Reflexes normal and symmetric. Sensation grossly WNL.  Genital/Rectal - deferred      ASSESSMENT / PLAN:   1. Medicare " "annual wellness visit, subsequent  See plan discussion below for healthcare maintenance recommendations.  Otherwise up-to-date    2. Anxiety state  Controlled.MARCOS = 0.  Continue current medication   - FLUoxetine (PROZAC) 20 MG capsule; Take 1 capsule (20 mg) by mouth daily  Dispense: 90 capsule; Refill: 3    3. Essential hypertension  controlled.  Continue current medication.  Repeat lab 1 year  - losartan-hydrochlorothiazide (HYZAAR) 100-12.5 MG tablet; TAKE 1/2 TABLET EVERY DAY  Dispense: 45 tablet; Refill: 3    4. Hyperlipidemia with target LDL less than 130  controlled.  Continue current medication.  Repeat lab 1 year  - pravastatin (PRAVACHOL) 40 MG tablet; TAKE 1 TABLET EVERY DAY  Dispense: 90 tablet; Refill: 3    5. Herpes simplex infection of skin  Currently asymptomatic.  Symptoms sound more like herpes simplex of the skin rather than zoster given recurrent occurrence intermittently.  Denies dental symptoms.  Valacyclovir for 3 days as needed flare  - valACYclovir (VALTREX) 500 MG tablet; Take 1 tablet (500 mg) by mouth 2 times daily for 3 days Per  painful skin rash flare  Dispense: 30 tablet; Refill: 0    6. Multiple thyroid nodules  Status post benign nodule biopsy.  Denies dysphagia issues.  Will repeat ultrasound in 1 year to ensure stability    7. Primary osteoarthritis, unspecified site  Chronic.  Managed by orthopedics with intermittent cortisone injection    8. Osteopenia, unspecified location  Due for DEXA again in 1 year      Patient has been advised of split billing requirements and indicates understanding: Yes       COUNSELING:  Reviewed preventive health counseling, as reflected in patient instructions    Estimated body mass index is 23.66 kg/m  as calculated from the following:    Height as of 8/21/20: 1.638 m (5' 4.5\").    Weight as of 8/21/20: 63.5 kg (140 lb).        She reports that she quit smoking about 51 years ago. Her smoking use included cigarettes. She has a 2.50 pack-year smoking " history. She has never used smokeless tobacco.      Appropriate preventive services were discussed with this patient, including applicable screening as appropriate for cardiovascular disease, diabetes, osteopenia/osteoporosis, and glaucoma.  As appropriate for age/gender, discussed screening for colorectal cancer, prostate cancer, breast cancer, and cervical cancer. Checklist reviewing preventive services available has been given to the patient.    Reviewed patients plan of care and provided an AVS. The Basic Care Plan (routine screening as documented in Health Maintenance) for Oliva meets the Care Plan requirement. This Care Plan has been established and reviewed with the Patient.    Counseling Resources:  ATP IV Guidelines  Pooled Cohorts Equation Calculator  Breast Cancer Risk Calculator  Breast Cancer: Medication to Reduce Risk  FRAX Risk Assessment  ICSI Preventive Guidelines  Dietary Guidelines for Americans, 2010  USDA's MyPlate  ASA Prophylaxis  Lung CA Screening      PLAN:  Continue current meds  Prescriptions refilled.    Continue diet/exercise  Call  [phone] or use [employer] to schedule a future lab appointment  fasting in 1 year.   For fasting labs, please refrain from eating for 8 hours or more.   Drink 2 glasses of water before your lab appointment. It is fine to take your  oral medications on the morning of the lab test as usual  Schedule a follow up appointment with me in clinic a few days after these future labs are drawn to review results and other medical issues as necessary  Valcyclovir 500mg tab, 1 tab twice a day for 3 days per flare of painful skin  rash in buttock cream area  Bone density (DEXA) in 1 year. This will be done at the [facility]. Call [phone] or use [employer] to schedule.   Thyroid ultrasound in 1 year. Will arrange at follow-up appt  Check records to see if imaging of the abdomina aorta done before age 65  I would recommend you receive an influenza (flu)  vaccine  this Fall (October or early November)  Probable covid vaccine booster shot 8 months after your previous vaccine series. Will await further guidance from the CDC in September  Pt was informed regarding extra E&M billing for management of new or established medical issues not related to today's wellness visit    Future labs ordered per health maintenance protocol lab orders and annual review order signed    Mitch Traylor MD  [facility]

## 2021-08-24 ASSESSMENT — ANXIETY QUESTIONNAIRES: GAD7 TOTAL SCORE: 0

## 2021-09-04 ENCOUNTER — HEALTH MAINTENANCE LETTER (OUTPATIENT)
Age: 71
End: 2021-09-04

## 2021-09-30 DIAGNOSIS — I10 ESSENTIAL HYPERTENSION: ICD-10-CM

## 2021-10-01 RX ORDER — LOSARTAN POTASSIUM AND HYDROCHLOROTHIAZIDE 12.5; 1 MG/1; MG/1
TABLET ORAL
Qty: 45 TABLET | Refills: 3 | Status: SHIPPED | OUTPATIENT
Start: 2021-10-01 | End: 2022-08-26 | Stop reason: DRUGHIGH

## 2021-10-01 NOTE — TELEPHONE ENCOUNTER
Prescription approved per [facility] Refill Protocol.  Haseeb Santamaria RN  [facility] Triage Nurse

## 2021-10-29 DIAGNOSIS — F41.1 ANXIETY STATE: ICD-10-CM

## 2021-10-29 DIAGNOSIS — E78.5 HYPERLIPIDEMIA WITH TARGET LDL LESS THAN 130: ICD-10-CM

## 2021-10-29 RX ORDER — PRAVASTATIN SODIUM 40 MG
TABLET ORAL
Qty: 90 TABLET | Refills: 2 | Status: SHIPPED | OUTPATIENT
Start: 2021-10-29 | End: 2022-08-26

## 2021-10-29 NOTE — TELEPHONE ENCOUNTER
Prescription approved per [facility] Refill Protocol.  Haseeb Santamaria RN  [facility] Triage Nurse

## 2022-01-18 DIAGNOSIS — F41.1 ANXIETY STATE: ICD-10-CM

## 2022-02-08 ENCOUNTER — MYC MEDICAL ADVICE (OUTPATIENT)
Dept: INTERNAL MEDICINE | Facility: CLINIC | Age: 72
End: 2022-02-08
Payer: COMMERCIAL

## 2022-02-08 DIAGNOSIS — I10 ESSENTIAL HYPERTENSION: ICD-10-CM

## 2022-02-11 RX ORDER — LOSARTAN POTASSIUM AND HYDROCHLOROTHIAZIDE 12.5; 1 MG/1; MG/1
1 TABLET ORAL DAILY
Qty: 90 TABLET | Refills: 3 | Status: SHIPPED | OUTPATIENT
Start: 2022-02-11 | End: 2022-08-26

## 2022-02-11 RX ORDER — LOSARTAN POTASSIUM AND HYDROCHLOROTHIAZIDE 12.5; 1 MG/1; MG/1
TABLET ORAL
Qty: 45 TABLET | Refills: 3 | Status: CANCELLED | OUTPATIENT
Start: 2022-02-11

## 2022-02-11 NOTE — TELEPHONE ENCOUNTER
Pt called requesting losartan-hydrochlorothiazide (HYZAAR) 100-12.5 MG tablet refill. Reports she has been taking a full tablet daily for several weeks. While on a full tablet of Hyzaar, systolic BP has ranged from 116-130 and diastolic 64-77. Pt didn't record pulse.    She is requesting refill to [place] [facility] where she is currently.     Please see [employer] message and advice if medications dose increase is appropriate. Pt denies any vision problems HA or dizziness during call.     Routing refill request to provider for review/approval because:  Pt is requesting dose increase refill. Instead of half a tablet, pt wants to continue taking one tablet daily.      Pt agreed to go back to 0.5 tablets if PCP would not agree with dose change.     She will need a refill regardless of dose.

## 2022-08-19 ENCOUNTER — LAB (OUTPATIENT)
Dept: LAB | Facility: CLINIC | Age: 72
End: 2022-08-19
Payer: COMMERCIAL

## 2022-08-19 DIAGNOSIS — I10 ESSENTIAL HYPERTENSION: ICD-10-CM

## 2022-08-19 DIAGNOSIS — E78.5 HYPERLIPIDEMIA WITH TARGET LDL LESS THAN 130: ICD-10-CM

## 2022-08-19 LAB
ALT SERPL W P-5'-P-CCNC: 31 U/L (ref 0–50)
ANION GAP SERPL CALCULATED.3IONS-SCNC: 4 MMOL/L (ref 3–14)
BUN SERPL-MCNC: 15 MG/DL (ref 7–30)
CALCIUM SERPL-MCNC: 9.8 MG/DL (ref 8.5–10.1)
CHLORIDE BLD-SCNC: 107 MMOL/L (ref 94–109)
CO2 SERPL-SCNC: 29 MMOL/L (ref 20–32)
CREAT SERPL-MCNC: 0.71 MG/DL (ref 0.52–1.04)
GFR SERPL CREATININE-BSD FRML MDRD: 89 ML/MIN/1.73M2
GLUCOSE BLD-MCNC: 92 MG/DL (ref 70–99)
POTASSIUM BLD-SCNC: 3.6 MMOL/L (ref 3.4–5.3)
SODIUM SERPL-SCNC: 140 MMOL/L (ref 133–144)

## 2022-08-19 PROCEDURE — 84460 ALANINE AMINO (ALT) (SGPT): CPT

## 2022-08-19 PROCEDURE — 80048 BASIC METABOLIC PNL TOTAL CA: CPT

## 2022-08-19 PROCEDURE — 36415 COLL VENOUS BLD VENIPUNCTURE: CPT

## 2022-08-19 ASSESSMENT — ACTIVITIES OF DAILY LIVING (ADL): CURRENT_FUNCTION: NO ASSISTANCE NEEDED

## 2022-08-19 ASSESSMENT — ENCOUNTER SYMPTOMS
EYE PAIN: 0
ABDOMINAL PAIN: 0
WEAKNESS: 0
JOINT SWELLING: 0
MYALGIAS: 0
CHILLS: 0
PALPITATIONS: 0
DYSURIA: 0
FEVER: 0
HEMATURIA: 0
HEMATOCHEZIA: 0
CONSTIPATION: 0
SORE THROAT: 0
NERVOUS/ANXIOUS: 0
DIZZINESS: 0
FREQUENCY: 0
COUGH: 0
DIARRHEA: 0
BREAST MASS: 0
NAUSEA: 0
HEARTBURN: 0
SHORTNESS OF BREATH: 0
ARTHRALGIAS: 0
HEADACHES: 0
PARESTHESIAS: 0

## 2022-08-26 ENCOUNTER — MYC MEDICAL ADVICE (OUTPATIENT)
Dept: INTERNAL MEDICINE | Facility: CLINIC | Age: 72
End: 2022-08-26

## 2022-08-26 ENCOUNTER — OFFICE VISIT (OUTPATIENT)
Dept: INTERNAL MEDICINE | Facility: CLINIC | Age: 72
End: 2022-08-26
Payer: COMMERCIAL

## 2022-08-26 VITALS
WEIGHT: 136 LBS | DIASTOLIC BLOOD PRESSURE: 68 MMHG | SYSTOLIC BLOOD PRESSURE: 132 MMHG | HEIGHT: 64 IN | TEMPERATURE: 98 F | BODY MASS INDEX: 23.22 KG/M2 | OXYGEN SATURATION: 98 % | HEART RATE: 59 BPM

## 2022-08-26 DIAGNOSIS — Z00.00 MEDICARE ANNUAL WELLNESS VISIT, SUBSEQUENT: ICD-10-CM

## 2022-08-26 DIAGNOSIS — M85.80 OSTEOPENIA, UNSPECIFIED LOCATION: ICD-10-CM

## 2022-08-26 DIAGNOSIS — E78.5 HYPERLIPIDEMIA WITH TARGET LDL LESS THAN 130: ICD-10-CM

## 2022-08-26 DIAGNOSIS — I10 ESSENTIAL HYPERTENSION: ICD-10-CM

## 2022-08-26 DIAGNOSIS — E04.2 MULTIPLE THYROID NODULES: ICD-10-CM

## 2022-08-26 DIAGNOSIS — Z78.0 ASYMPTOMATIC MENOPAUSAL STATE: ICD-10-CM

## 2022-08-26 DIAGNOSIS — F41.1 ANXIETY STATE: ICD-10-CM

## 2022-08-26 LAB
CHOLEST SERPL-MCNC: 225 MG/DL
FASTING STATUS PATIENT QL REPORTED: YES
HDLC SERPL-MCNC: 92 MG/DL
LDLC SERPL CALC-MCNC: 113 MG/DL
NONHDLC SERPL-MCNC: 133 MG/DL
TRIGL SERPL-MCNC: 102 MG/DL

## 2022-08-26 PROCEDURE — 80061 LIPID PANEL: CPT | Performed by: INTERNAL MEDICINE

## 2022-08-26 PROCEDURE — 99214 OFFICE O/P EST MOD 30 MIN: CPT | Mod: 25 | Performed by: INTERNAL MEDICINE

## 2022-08-26 PROCEDURE — G0439 PPPS, SUBSEQ VISIT: HCPCS | Performed by: INTERNAL MEDICINE

## 2022-08-26 PROCEDURE — 36415 COLL VENOUS BLD VENIPUNCTURE: CPT | Performed by: INTERNAL MEDICINE

## 2022-08-26 RX ORDER — LOSARTAN POTASSIUM AND HYDROCHLOROTHIAZIDE 12.5; 1 MG/1; MG/1
1 TABLET ORAL DAILY
Qty: 90 TABLET | Refills: 3 | Status: SHIPPED | OUTPATIENT
Start: 2022-08-26 | End: 2023-02-06

## 2022-08-26 RX ORDER — PRAVASTATIN SODIUM 40 MG
TABLET ORAL
Qty: 90 TABLET | Refills: 3 | Status: SHIPPED | OUTPATIENT
Start: 2022-08-26 | End: 2023-08-28

## 2022-08-26 ASSESSMENT — ENCOUNTER SYMPTOMS
ABDOMINAL PAIN: 0
DIZZINESS: 0
PALPITATIONS: 0
NAUSEA: 0
BREAST MASS: 0
SORE THROAT: 0
COUGH: 0
CONSTIPATION: 0
HEARTBURN: 0
WEAKNESS: 0
NERVOUS/ANXIOUS: 0
ARTHRALGIAS: 1
SHORTNESS OF BREATH: 0
FREQUENCY: 0
HEADACHES: 0
CHILLS: 0
HEMATOCHEZIA: 0
MYALGIAS: 0
FEVER: 0
PARESTHESIAS: 0
DIARRHEA: 0
HEMATURIA: 0
JOINT SWELLING: 0
EYE PAIN: 0
DYSURIA: 0

## 2022-08-26 ASSESSMENT — ANXIETY QUESTIONNAIRES
5. BEING SO RESTLESS THAT IT IS HARD TO SIT STILL: NOT AT ALL
IF YOU CHECKED OFF ANY PROBLEMS ON THIS QUESTIONNAIRE, HOW DIFFICULT HAVE THESE PROBLEMS MADE IT FOR YOU TO DO YOUR WORK, TAKE CARE OF THINGS AT HOME, OR GET ALONG WITH OTHER PEOPLE: NOT DIFFICULT AT ALL
6. BECOMING EASILY ANNOYED OR IRRITABLE: NOT AT ALL
2. NOT BEING ABLE TO STOP OR CONTROL WORRYING: NOT AT ALL
3. WORRYING TOO MUCH ABOUT DIFFERENT THINGS: NOT AT ALL
GAD7 TOTAL SCORE: 0
GAD7 TOTAL SCORE: 0
7. FEELING AFRAID AS IF SOMETHING AWFUL MIGHT HAPPEN: NOT AT ALL
1. FEELING NERVOUS, ANXIOUS, OR ON EDGE: NOT AT ALL

## 2022-08-26 ASSESSMENT — ACTIVITIES OF DAILY LIVING (ADL): CURRENT_FUNCTION: NO ASSISTANCE NEEDED

## 2022-08-26 ASSESSMENT — PATIENT HEALTH QUESTIONNAIRE - PHQ9: 5. POOR APPETITE OR OVEREATING: NOT AT ALL

## 2022-08-26 NOTE — PATIENT INSTRUCTIONS
Continue current medications  Prescriptions refilled.    Labs today as ordered  Call  [phone] or use tic to schedule a future lab appointment  fasting in 1 year.   For fasting labs, please refrain from eating for 8 hours or more.   Drink 2 glasses of water before your lab appointment. It is fine to take your  oral medications on the morning of the lab test as usual  Schedule a follow up appointment with me in clinic a few days after these future labs are drawn to review results and other medical issues as necessary  Thyroid ultrasound. [facility] will call you to schedule. If you have not heard from their schedulers within 2 business days, then call [phone] (radiology scheduling)  Bone density (DEXA) Spring 2023. This will be done at the [facility]. Call [phone] or use tic to schedule.   Mammogram 9/9/22 as scheduled   Eye appt in October with [facility]. . Please ask the eye clinic to fax us a report of your eye exam to [phone], attention Dr Traylor  I would recommend you receive an influenza (flu) vaccine  this Fall (mid/late October)  Send me  dates of your 3rd and 4th covid vaccines in tic message  Pt was informed regarding extra E&M billing for management of new or established medical issues not related to today's wellness visit

## 2022-08-26 NOTE — PROGRESS NOTES
"SUBJECTIVE:   Oliva Kearney is a 72 year old female who presents for Preventive Visit and follow-up regarding hyperlipidemia, hypertension, anxiety      Patient has been advised of split billing requirements and indicates understanding: Yes  Are you in the first 12 months of your Medicare coverage?  No    Healthy Habits:     In general, how would you rate your overall health?  Excellent    Frequency of exercise:  4-5 days/week    Duration of exercise:  N/A    Do you usually eat at least 4 servings of fruit and vegetables a day, include whole grains    & fiber and avoid regularly eating high fat or \"junk\" foods?  Yes    Taking medications regularly:  Yes    Medication side effects:  None    Ability to successfully perform activities of daily living:  No assistance needed    Home Safety:  No safety concerns identified    Hearing Impairment:  No hearing concerns    In the past 6 months, have you been bothered by leaking of urine? Yes    In general, how would you rate your overall mental or emotional health?  Excellent      PHQ-2 Total Score: 0    Additional concerns today:  Yes    Do you feel safe in your environment? Yes    Have you ever done Advance Care Planning? (For example, a Health Directive, POLST, or a discussion with a medical provider or your loved ones about your wishes): Yes, advance care planning is on file.       Fall risk  Fallen 2 or more times in the past year?: No  Any fall with injury in the past year?: No    Cognitive Screening   1) Repeat 3 items (Leader, Season, Table)    2) Clock draw: NORMAL  3) 3 item recall: Recalls 3 objects  Results: 3 items recalled: COGNITIVE IMPAIRMENT LESS LIKELY    Mini-CogTM Copyright ARMEN Mckay. Licensed by the author for use in [facility]; reprinted with permission ([email or web address]). All rights reserved.      Do you have sleep apnea, excessive snoring or daytime drowsiness?: no    Reviewed and updated as needed this visit by clinical staff           "          Reviewed and updated as needed this visit by Provider                   Social History     Tobacco Use     Smoking status: Former Smoker     Packs/day: 0.25     Years: 10.00     Pack years: 2.50     Types: Cigarettes     Quit date: 10/26/1969     Years since quittin.8     Smokeless tobacco: Never Used     Tobacco comment: light cigarette use   Substance Use Topics     Alcohol use: Yes     Comment: 2x week     If you drink alcohol do you typically have >3 drinks per day or >7 drinks per week? No    Alcohol Use 2022   Prescreen: >3 drinks/day or >7 drinks/week? No   Prescreen: >3 drinks/day or >7 drinks/week? -   AUDIT SCORE  -               Current providers sharing in care for this patient include:   Patient Care Team:  Mitch Traylor MD as PCP - General  Mitch Traylor MD as Assigned PCP    The following health maintenance items are reviewed in Epic and correct as of today:  Health Maintenance Due   Topic Date Due     LUNG CANCER SCREENING  2018     COVID-19 Vaccine (3 - Booster for Moderna series) 2021     DEXA  2022     LIPID  2022     ANNUAL REVIEW OF HM ORDERS  2022     MEDICARE ANNUAL WELLNESS VISIT  2022     INFLUENZA VACCINE (1) 2022     Labs reviewed in EPIC          Review of Systems   Constitutional: Negative for chills and fever.   HENT: Negative for congestion, ear pain, hearing loss and sore throat.    Eyes: Negative for pain and visual disturbance.   Respiratory: Negative for cough and shortness of breath.    Cardiovascular: Negative for chest pain, palpitations and peripheral edema.   Gastrointestinal: Negative for abdominal pain, constipation, diarrhea, heartburn, hematochezia and nausea.   Breasts:  Negative for tenderness, breast mass and discharge.   Genitourinary: Negative for dysuria, frequency, genital sores, hematuria, pelvic pain, urgency, vaginal bleeding and vaginal discharge.   Musculoskeletal: Positive for arthralgias. Negative  "for joint swelling and myalgias.   Skin: Negative for rash.   Neurological: Negative for dizziness, weakness, headaches and paresthesias.   Psychiatric/Behavioral: Negative for mood changes. The patient is not nervous/anxious.      Has osteoarthritis.  Occasional Tylenol use  Anxiety well controlled with fluoxetine. GAD7 = 0  History 3 thyroid nodules.  Previous benign biopsy of nodule #1 and #3.  Nodule #2 was too small to biopsy 3 years ago    OBJECTIVE:   /68   Pulse 59   Temp 98  F (36.7  C) (Oral)   Ht 1.613 m (5' 3.5\")   Wt 61.7 kg (136 lb)   SpO2 98%   BMI 23.71 kg/m   Estimated body mass index is 22.14 kg/m  as calculated from the following:    Height as of 8/23/21: 1.638 m (5' 4.5\").    Weight as of 8/23/21: 59.4 kg (131 lb).  Physical Exam  Eye: PERRL, EOMI  HENT: ear canals and TM's normal and nose and mouth without ulcers or lesions   Neck: no adenopathy. Thyroid nodules slightly palpable right side nontender.  No bruits  Pulm: Lungs clear to auscultation   CV: Regular rates and rhythm  GI: Soft, nontender, Normal active bowel sounds, No hepatosplenomegaly or masses palpable  Ext: Peripheral pulses intact. No edema.  DIP and PIP joints of hands with osteoarthritis thickening.  No increased warmth erythema  Neuro: Normal strength and tone, sensory exam grossly normal      ASSESSMENT / PLAN:   1. Medicare annual wellness visit, subsequent  Has eye exam scheduled for October 2022 with EEP&S.  Mammogram scheduled for September.  Colonoscopy due in 2024 and DEXA due in 2023.  Other healthcare maintenance up-to-date.  Independent in ADLs    2. Essential hypertension  Controlled.  Continue current medication.  Recent BMP normal  - losartan-hydrochlorothiazide (HYZAAR) 100-12.5 MG tablet; Take 1 tablet by mouth daily  Dispense: 90 tablet; Refill: 3    3. Osteopenia, unspecified location  Due for DEXA spring 2023.  Previous treatment with Boniva and now on drug holiday  - DEXA HIP/PELVIS/SPINE - " "Future; Future    4. Hyperlipidemia with target LDL less than 130  Present controlled.  Continue pravastatin pending lab results.  Lipid lab today  - Lipid panel reflex to direct LDL Non-fasting; Future  - pravastatin (PRAVACHOL) 40 MG tablet; TAKE ONE TABLET BY MOUTH EVERY DAY  Dispense: 90 tablet; Refill: 3  - Lipid panel reflex to direct LDL Non-fasting    5. Multiple thyroid nodules  History of right side nodule x3.  Two have been biopsied and benign.  Third was too small to biopsy 3 years ago.  Recheck thyroid ultrasound.  Thyroid function has been normal  - US Thyroid; Future    6. Anxiety state  Controlled.  Continue current medication  - FLUoxetine (PROZAC) 20 MG capsule; Take 1 capsule (20 mg) by mouth daily  Dispense: 90 capsule; Refill: 3    7. Asymptomatic menopausal state    See #3  - DEXA HIP/PELVIS/SPINE - Future; Future      Patient has been advised of split billing requirements and indicates understanding: Yes    COUNSELING:  Reviewed preventive health counseling, as reflected in patient instructions    Estimated body mass index is 23.71 kg/m  as calculated from the following:    Height as of this encounter: 1.613 m (5' 3.5\").    Weight as of this encounter: 61.7 kg (136 lb).        She reports that she quit smoking about 52 years ago. Her smoking use included cigarettes. She has a 2.50 pack-year smoking history. She has never used smokeless tobacco.      Appropriate preventive services were discussed with this patient, including applicable screening as appropriate for cardiovascular disease, diabetes, osteopenia/osteoporosis, and glaucoma.  As appropriate for age/gender, discussed screening for colorectal cancer, prostate cancer, breast cancer, and cervical cancer. Checklist reviewing preventive services available has been given to the patient.    Reviewed patients plan of care and provided an AVS. The Basic Care Plan (routine screening as documented in Health Maintenance) for Oliva meets the " Care Plan requirement. This Care Plan has been established and reviewed with the Patient.    Counseling Resources:  ATP IV Guidelines  Pooled Cohorts Equation Calculator  Breast Cancer Risk Calculator  Breast Cancer: Medication to Reduce Risk  FRAX Risk Assessment  ICSI Preventive Guidelines  Dietary Guidelines for Americans, 2010  USDA's MyPlate  ASA Prophylaxis  Lung CA Screening      PLAN:  Continue current medications  Prescriptions refilled.    Labs today as ordered  Call  [phone] or use [employer] to schedule a future lab appointment  fasting in 1 year.   For fasting labs, please refrain from eating for 8 hours or more.   Drink 2 glasses of water before your lab appointment. It is fine to take your  oral medications on the morning of the lab test as usual  Schedule a follow up appointment with me in clinic a few days after these future labs are drawn to review results and other medical issues as necessary  Thyroid ultrasound. [facility] will call you to schedule. If you have not heard from their schedulers within 2 business days, then call [phone] (radiology scheduling)  Bone density (DEXA) Spring 2023. This will be done at the [facility]. Call [phone] or use [employer] to schedule.   Mammogram 9/9/22 as scheduled   Eye appt in October with EEP&S.  Please ask the eye clinic to fax us a report of your eye exam to [phone], attention Dr Traylor  I would recommend you receive an influenza (flu) vaccine  this Fall (mid/late October)  Send me  dates of your 3rd and 4th covid vaccines in [employer] message  Pt was informed regarding extra E&M billing for management of new or established medical issues not related to today's wellness visit    Mitch Traylor MD  [facility]

## 2022-09-02 ENCOUNTER — HOSPITAL ENCOUNTER (OUTPATIENT)
Dept: ULTRASOUND IMAGING | Facility: CLINIC | Age: 72
Discharge: HOME | End: 2022-09-02
Attending: INTERNAL MEDICINE | Admitting: INTERNAL MEDICINE
Payer: COMMERCIAL

## 2022-09-02 DIAGNOSIS — E04.2 MULTIPLE THYROID NODULES: ICD-10-CM

## 2022-09-02 PROCEDURE — 76536 US EXAM OF HEAD AND NECK: CPT

## 2022-09-09 ENCOUNTER — HOSPITAL ENCOUNTER (OUTPATIENT)
Dept: MAMMOGRAPHY | Facility: CLINIC | Age: 72
Discharge: HOME | End: 2022-09-09
Attending: INTERNAL MEDICINE | Admitting: INTERNAL MEDICINE
Payer: COMMERCIAL

## 2022-09-09 DIAGNOSIS — Z12.31 VISIT FOR SCREENING MAMMOGRAM: ICD-10-CM

## 2022-09-09 PROCEDURE — 77063 BREAST TOMOSYNTHESIS BI: CPT

## 2022-09-16 ENCOUNTER — MYC MEDICAL ADVICE (OUTPATIENT)
Dept: INTERNAL MEDICINE | Facility: CLINIC | Age: 72
End: 2022-09-16

## 2022-09-16 DIAGNOSIS — E78.5 HYPERLIPIDEMIA WITH TARGET LDL LESS THAN 130: Primary | ICD-10-CM

## 2022-10-05 NOTE — TELEPHONE ENCOUNTER
Spoke with patient by phone.  Simply based on ultrasound results alone from thyroid, radiology recommended biopsy of nodule #3.  Nodule #3 #1 have previously been biopsied and showed to be benign.  Ultrasound has been ordered to follow-up on nodule #2 which has disappeared.  Because nodule #3 has not increased in at least 2 planes by over 20% (criteria recommended in Uptodate for considering repeat bx) and previously found to be benign on pathology, do not feel repeat   biopsy of nodule #3 necessary.  Reviewed lipid labs also.  LDL worsened slightly compared to usual.  Previously  over the past 2 years.  We will continue pravastatin 40 mg daily for now and have patient improve diet and repeat lipids again August 2023.  Future labs ordered.  Patient in agreement with plan

## 2022-10-16 ENCOUNTER — HEALTH MAINTENANCE LETTER (OUTPATIENT)
Age: 72
End: 2022-10-16

## 2023-03-22 ENCOUNTER — TELEPHONE (OUTPATIENT)
Dept: INTERNAL MEDICINE | Facility: CLINIC | Age: 73
End: 2023-03-22
Payer: COMMERCIAL

## 2023-03-22 DIAGNOSIS — E04.2 MULTIPLE THYROID NODULES: ICD-10-CM

## 2023-03-22 DIAGNOSIS — I10 ESSENTIAL HYPERTENSION: Primary | ICD-10-CM

## 2023-03-22 NOTE — TELEPHONE ENCOUNTER
Order/Referral Request    Who is requesting: Patient      Orders being requested: Annual labs    Reason service is needed/diagnosis: Patient wants annual labs done before her appointment so she can go over the results with her provider.    When are orders needed by: 8/25/23    Has this been discussed with Provider: No    Does patient have a preference on a Group/Provider/Facility? [place]     Does patient have an appointment scheduled?: Yes: 8/25/23    Where to send orders: Place orders within Epic    Could we send this information to you in [place] or would you prefer to receive a phone call?:   Patient would prefer a phone call   Okay to leave a detailed message?: Yes at Cell number on file:    Telephone Information:   Mobile [phone]

## 2023-06-12 ENCOUNTER — ANCILLARY PROCEDURE (OUTPATIENT)
Dept: BONE DENSITY | Facility: CLINIC | Age: 73
End: 2023-06-12
Attending: INTERNAL MEDICINE
Payer: COMMERCIAL

## 2023-06-12 DIAGNOSIS — Z78.0 ASYMPTOMATIC MENOPAUSAL STATE: ICD-10-CM

## 2023-06-12 DIAGNOSIS — M85.80 OSTEOPENIA, UNSPECIFIED LOCATION: ICD-10-CM

## 2023-06-12 PROCEDURE — 77085 DXA BONE DENSITY AXL VRT FX: CPT | Performed by: INTERNAL MEDICINE

## 2023-07-18 ENCOUNTER — MYC REFILL (OUTPATIENT)
Dept: INTERNAL MEDICINE | Facility: CLINIC | Age: 73
End: 2023-07-18
Payer: COMMERCIAL

## 2023-07-18 DIAGNOSIS — B00.9 HERPES SIMPLEX INFECTION OF SKIN: ICD-10-CM

## 2023-07-19 RX ORDER — VALACYCLOVIR HYDROCHLORIDE 500 MG/1
500 TABLET, FILM COATED ORAL 2 TIMES DAILY
Qty: 30 TABLET | Refills: 0 | Status: SHIPPED | OUTPATIENT
Start: 2023-07-19 | End: 2024-08-26

## 2023-08-25 ENCOUNTER — LAB (OUTPATIENT)
Dept: LAB | Facility: CLINIC | Age: 73
End: 2023-08-25
Payer: COMMERCIAL

## 2023-08-25 DIAGNOSIS — E04.2 MULTIPLE THYROID NODULES: ICD-10-CM

## 2023-08-25 DIAGNOSIS — I10 ESSENTIAL HYPERTENSION: ICD-10-CM

## 2023-08-25 DIAGNOSIS — E78.5 HYPERLIPIDEMIA WITH TARGET LDL LESS THAN 130: Primary | ICD-10-CM

## 2023-08-25 LAB
ALBUMIN SERPL BCG-MCNC: 4.3 G/DL (ref 3.5–5.2)
ALP SERPL-CCNC: 64 U/L (ref 35–104)
ALT SERPL W P-5'-P-CCNC: 21 U/L (ref 0–50)
ANION GAP SERPL CALCULATED.3IONS-SCNC: 8 MMOL/L (ref 7–15)
AST SERPL W P-5'-P-CCNC: 22 U/L (ref 0–45)
BILIRUB SERPL-MCNC: 0.6 MG/DL
BUN SERPL-MCNC: 17.4 MG/DL (ref 8–23)
CALCIUM SERPL-MCNC: 9.6 MG/DL (ref 8.8–10.2)
CHLORIDE SERPL-SCNC: 104 MMOL/L (ref 98–107)
CHOLEST SERPL-MCNC: 198 MG/DL
CREAT SERPL-MCNC: 0.73 MG/DL (ref 0.51–0.95)
DEPRECATED HCO3 PLAS-SCNC: 26 MMOL/L (ref 22–29)
ERYTHROCYTE [DISTWIDTH] IN BLOOD BY AUTOMATED COUNT: 12.2 % (ref 10–15)
GFR SERPL CREATININE-BSD FRML MDRD: 86 ML/MIN/1.73M2
GLUCOSE SERPL-MCNC: 93 MG/DL (ref 70–99)
HCT VFR BLD AUTO: 36.9 % (ref 35–47)
HDLC SERPL-MCNC: 76 MG/DL
HGB BLD-MCNC: 12.4 G/DL (ref 11.7–15.7)
LDLC SERPL CALC-MCNC: 100 MG/DL
MCH RBC QN AUTO: 31.9 PG (ref 26.5–33)
MCHC RBC AUTO-ENTMCNC: 33.6 G/DL (ref 31.5–36.5)
MCV RBC AUTO: 95 FL (ref 78–100)
NONHDLC SERPL-MCNC: 122 MG/DL
PLATELET # BLD AUTO: 199 10E3/UL (ref 150–450)
POTASSIUM SERPL-SCNC: 4.1 MMOL/L (ref 3.4–5.3)
PROT SERPL-MCNC: 6.4 G/DL (ref 6.4–8.3)
RBC # BLD AUTO: 3.89 10E6/UL (ref 3.8–5.2)
SODIUM SERPL-SCNC: 138 MMOL/L (ref 136–145)
TRIGL SERPL-MCNC: 108 MG/DL
TSH SERPL DL<=0.005 MIU/L-ACNC: 2.96 UIU/ML (ref 0.3–4.2)
WBC # BLD AUTO: 3.9 10E3/UL (ref 4–11)

## 2023-08-25 PROCEDURE — 84443 ASSAY THYROID STIM HORMONE: CPT

## 2023-08-25 PROCEDURE — 36415 COLL VENOUS BLD VENIPUNCTURE: CPT

## 2023-08-25 PROCEDURE — 80061 LIPID PANEL: CPT

## 2023-08-25 PROCEDURE — 85027 COMPLETE CBC AUTOMATED: CPT

## 2023-08-25 PROCEDURE — 80053 COMPREHEN METABOLIC PANEL: CPT

## 2023-08-28 ENCOUNTER — OFFICE VISIT (OUTPATIENT)
Dept: INTERNAL MEDICINE | Facility: CLINIC | Age: 73
End: 2023-08-28
Payer: COMMERCIAL

## 2023-08-28 VITALS
TEMPERATURE: 98.1 F | OXYGEN SATURATION: 100 % | SYSTOLIC BLOOD PRESSURE: 140 MMHG | BODY MASS INDEX: 23.37 KG/M2 | HEART RATE: 58 BPM | WEIGHT: 136.9 LBS | HEIGHT: 64 IN | DIASTOLIC BLOOD PRESSURE: 68 MMHG

## 2023-08-28 DIAGNOSIS — E04.2 MULTIPLE THYROID NODULES: ICD-10-CM

## 2023-08-28 DIAGNOSIS — D72.819 LEUKOPENIA, UNSPECIFIED TYPE: ICD-10-CM

## 2023-08-28 DIAGNOSIS — I10 ESSENTIAL HYPERTENSION: ICD-10-CM

## 2023-08-28 DIAGNOSIS — F41.1 ANXIETY STATE: ICD-10-CM

## 2023-08-28 DIAGNOSIS — E78.5 HYPERLIPIDEMIA WITH TARGET LDL LESS THAN 130: ICD-10-CM

## 2023-08-28 DIAGNOSIS — Z00.00 MEDICARE ANNUAL WELLNESS VISIT, SUBSEQUENT: ICD-10-CM

## 2023-08-28 DIAGNOSIS — Z82.49 FAMILY HISTORY OF ABDOMINAL AORTIC ANEURYSM (AAA): ICD-10-CM

## 2023-08-28 PROCEDURE — 99214 OFFICE O/P EST MOD 30 MIN: CPT | Mod: 25 | Performed by: INTERNAL MEDICINE

## 2023-08-28 PROCEDURE — G0439 PPPS, SUBSEQ VISIT: HCPCS | Performed by: INTERNAL MEDICINE

## 2023-08-28 RX ORDER — LOSARTAN POTASSIUM AND HYDROCHLOROTHIAZIDE 12.5; 1 MG/1; MG/1
1 TABLET ORAL DAILY
Qty: 90 TABLET | Refills: 3 | Status: SHIPPED | OUTPATIENT
Start: 2023-08-28 | End: 2023-11-01

## 2023-08-28 RX ORDER — PRAVASTATIN SODIUM 40 MG
TABLET ORAL
Qty: 90 TABLET | Refills: 3 | Status: SHIPPED | OUTPATIENT
Start: 2023-08-28 | End: 2024-09-16

## 2023-08-28 ASSESSMENT — ENCOUNTER SYMPTOMS
JOINT SWELLING: 0
NERVOUS/ANXIOUS: 0
DIARRHEA: 0
MYALGIAS: 0
COUGH: 0
BREAST MASS: 0
ARTHRALGIAS: 0
DIZZINESS: 0
SHORTNESS OF BREATH: 0
SORE THROAT: 0
ABDOMINAL PAIN: 0
EYE PAIN: 0
CHILLS: 0
HEARTBURN: 0
HEMATURIA: 0
PARESTHESIAS: 0
NAUSEA: 0
HEMATOCHEZIA: 0
WEAKNESS: 0
FEVER: 0
PALPITATIONS: 0
CONSTIPATION: 0
HEADACHES: 0
DYSURIA: 0
FREQUENCY: 0

## 2023-08-28 ASSESSMENT — ACTIVITIES OF DAILY LIVING (ADL): CURRENT_FUNCTION: NO ASSISTANCE NEEDED

## 2023-08-28 NOTE — PROGRESS NOTES
"SUBJECTIVE:   Lesley is a 73 year old who presents for Preventive Visit and follow-up regarding multiple medical issues as below      Are you in the first 12 months of your Medicare coverage?  No    Healthy Habits:     In general, how would you rate your overall health?  Excellent    Frequency of exercise:  2-3 days/week    Duration of exercise:  15-30 minutes    Do you usually eat at least 4 servings of fruit and vegetables a day, include whole grains    & fiber and avoid regularly eating high fat or \"junk\" foods?  Yes    Taking medications regularly:  Yes    Medication side effects:  None    Ability to successfully perform activities of daily living:  No assistance needed    Home Safety:  No safety concerns identified    Hearing Impairment:  No hearing concerns    In the past 6 months, have you been bothered by leaking of urine? Yes    In general, how would you rate your overall mental or emotional health?  Excellent    Additional concerns today:  No      Have you ever done Advance Care Planning? (For example, a Health Directive, POLST, or a discussion with a medical provider or your loved ones about your wishes): Yes, advance care planning is on file.       Fall risk  Fallen 2 or more times in the past year?: No  Any fall with injury in the past year?: No    Cognitive Screening   1) Repeat 3 items (Leader, Season, Table)    2) Clock draw: NORMAL  3) 3 item recall: Recalls 3 objects  Results: 3 items recalled: COGNITIVE IMPAIRMENT LESS LIKELY    Mini-CogTM Copyright S Nely. Licensed by the author for use in [facility]; reprinted with permission ([email or web address]). All rights reserved.      Do you have sleep apnea, excessive snoring or daytime drowsiness? : no    Reviewed and updated as needed this visit by clinical staff                  Reviewed and updated as needed this visit by Provider                 Social History     Tobacco Use    Smoking status: Former     Packs/day: 0.25     Years: 10.00     " Pack years: 2.50     Types: Cigarettes     Quit date: 10/26/1969     Years since quittin.8    Smokeless tobacco: Never    Tobacco comments:     light cigarette use   Substance Use Topics    Alcohol use: Yes     Comment: 2x week          2023    10:03 AM   Alcohol Use   Prescreen: >3 drinks/day or >7 drinks/week? No     Do you have a current opioid prescription? No  Do you use any other controlled substances or medications that are not prescribed by a provider? None            Current providers sharing in care for this patient include:   Patient Care Team:  Mitch Traylor MD as PCP - General  Mitch Traylor MD as Assigned PCP    The following health maintenance items are reviewed in Epic and correct as of today:  Health Maintenance   Topic Date Due    LUNG CANCER SCREENING  2018    ANNUAL REVIEW OF HM ORDERS  2023    MEDICARE ANNUAL WELLNESS VISIT  2023    INFLUENZA VACCINE (1) 2023    COLORECTAL CANCER SCREENING  2024    ALT  2024    BMP  2024    LIPID  2024    FALL RISK ASSESSMENT  2024    MAMMO SCREENING  2024    DTAP/TDAP/TD IMMUNIZATION (5 - Td or Tdap) 2026    DEXA  2026    ADVANCE CARE PLANNING  2027    HEPATITIS C SCREENING  Completed    PHQ-2 (once per calendar year)  Completed    Pneumococcal Vaccine: 65+ Years  Completed    ZOSTER IMMUNIZATION  Completed    COVID-19 Vaccine  Completed    IPV IMMUNIZATION  Aged Out    MENINGITIS IMMUNIZATION  Aged Out     Labs reviewed in EPIC      Component      Latest Ref Rng 2023  10:02 AM   Sodium      136 - 145 mmol/L 138    Potassium      3.4 - 5.3 mmol/L 4.1    Chloride      98 - 107 mmol/L 104    Carbon Dioxide (CO2)      22 - 29 mmol/L 26    Anion Gap      7 - 15 mmol/L 8    Urea Nitrogen      8.0 - 23.0 mg/dL 17.4    Creatinine      0.51 - 0.95 mg/dL 0.73    Calcium      8.8 - 10.2 mg/dL 9.6    Glucose      70 - 99 mg/dL 93    Alkaline Phosphatase      35 - 104 U/L 64     AST      0 - 45 U/L 22    ALT      0 - 50 U/L 21    Protein Total      6.4 - 8.3 g/dL 6.4    Albumin      3.5 - 5.2 g/dL 4.3    Bilirubin Total      <=1.2 mg/dL 0.6    GFR Estimate      >60 mL/min/1.73m2 86    WBC      4.0 - 11.0 10e3/uL 3.9 (L)    RBC Count      3.80 - 5.20 10e6/uL 3.89    Hemoglobin      11.7 - 15.7 g/dL 12.4    Hematocrit      35.0 - 47.0 % 36.9    MCV      78 - 100 fL 95    MCH      26.5 - 33.0 pg 31.9    MCHC      31.5 - 36.5 g/dL 33.6    RDW      10.0 - 15.0 % 12.2    Platelet Count      150 - 450 10e3/uL 199    Cholesterol      <200 mg/dL 198    Triglycerides      <150 mg/dL 108    HDL Cholesterol      >=50 mg/dL 76    LDL Cholesterol Calculated      <=100 mg/dL 100    Non HDL Cholesterol      <130 mg/dL 122    TSH      0.30 - 4.20 uIU/mL 2.96       Legend:  (L) Low      Review of Systems   Constitutional:  Negative for chills and fever.   HENT:  Negative for congestion, ear pain, hearing loss and sore throat.    Eyes:  Negative for pain and visual disturbance.   Respiratory:  Negative for cough and shortness of breath.    Cardiovascular:  Negative for chest pain, palpitations and peripheral edema.   Gastrointestinal:  Negative for abdominal pain, constipation, diarrhea, heartburn, hematochezia and nausea.   Breasts:  Negative for tenderness, breast mass and discharge.   Genitourinary:  Negative for dysuria, frequency, genital sores, hematuria, pelvic pain, urgency, vaginal bleeding and vaginal discharge.   Musculoskeletal:  Negative for arthralgias, joint swelling and myalgias.   Skin:  Negative for rash.   Neurological:  Negative for dizziness, weakness, headaches and paresthesias.   Psychiatric/Behavioral:  Negative for mood changes. The patient is not nervous/anxious.      Anxiety well controlled with use of fluoxetine.  History of thyroid nodules.  Due for thyroid ultrasound follow-up.  Previous biopsy reports reviewed.  Most recent thyroid lab function normal.  Family history of AAA.   "Has not undergone previous screening.  Denies abdominal pain issues  Rare small leaking of urine if sneezes a lot.  Not bothersome to patient and does not wish to pursue further work-up for medication treatment at this time or pelvic floor exercises  Will be spending the winter in [place]  Most recent DEXA reviewed with patient.  Previously Boniva treatment.  Now on drug holiday.  Stable density.  Will repeat in 3 years    OBJECTIVE:   BP (!) 140/68   Pulse 58   Temp 98.1  F (36.7  C) (Oral)   Ht 1.613 m (5' 3.5\")   Wt 62.1 kg (136 lb 14.4 oz)   SpO2 100%   BMI 23.87 kg/m   Estimated body mass index is 23.71 kg/m  as calculated from the following:    Height as of 8/26/22: 1.613 m (5' 3.5\").    Weight as of 8/26/22: 61.7 kg (136 lb).  Physical Exam  General appearance - healthy, alert, no distress. Calm affect  Skin - No rashes or lesions.  Head - normocephalic, atraumatic  Eyes - MANDO, EOMI, fundi exam with nondilated pupils negative.  Ears - External ears normal. Canals clear. TM's normal.  Nose/Sinuses - Nares normal. Septum midline. Mucosa normal. No drainage or sinus tenderness.  Oropharynx - No erythema, no adenopathy, no exudates.  Neck - Supple without adenopathy or thyromegaly.  Chronic right side thyroid nodule palpable, nontender. No bruits.  Lungs - Clear to auscultation without wheezes/rhonchi.  Heart - Regular rate and rhythm without murmurs, clicks, or gallops.  Nodes - No supraclavicular, axillary, or inguinal adenopathy palpable.  Breasts - deferred  Abdomen - Abdomen soft, non-tender. BS normal. No masses or hepatosplenomegaly palpable. No bruits. No AAA palpable  Extremities -No cyanosis, clubbing or edema.    Musculoskeletal - Spine ROM normal. Muscular strength intact.  DIP joints hands with mild osteoarthritis thickening. No erythema or tenderness to ROM  Peripheral pulses - radial=4/4, femoral=4/4, posterior tibial=4/4, dorsalis pedis=4/4,  Neuro - Gait normal. Reflexes normal and " symmetric. Sensation grossly WNL.  Genital/Rectal - deferred          ASSESSMENT / PLAN:   1. Medicare annual wellness visit, subsequent  See plan discussion below for healthcare maintenance.  Otherwise up-to-date.  Patient declines treatment needed for minimal stress urinary incontinence with sneeze  - PRIMARY CARE FOLLOW-UP SCHEDULING; Future    2. Leukopenia, unspecified type  Most recent  WBC 3.9 and nonspecific.  Asymptomatic.  Repeat lab 1 year  - CBC with platelets; Future  - PRIMARY CARE FOLLOW-UP SCHEDULING; Future    3. Hyperlipidemia with target LDL less than 130  Controlled.  Continue current medication.  Repeat lab 1 year  - Lipid panel reflex to direct LDL Fasting; Future  - Comprehensive metabolic panel; Future  - PRIMARY CARE FOLLOW-UP SCHEDULING; Future  - pravastatin (PRAVACHOL) 40 MG tablet; TAKE ONE TABLET BY MOUTH EVERY DAY  Dispense: 90 tablet; Refill: 3    4. Essential hypertension  Controlled.  Continue current medication.  Lab future as ordered.  - Comprehensive metabolic panel; Future  - CBC with platelets; Future  - US Aorta Medicare AAA Screening; Future  - PRIMARY CARE FOLLOW-UP SCHEDULING; Future  - losartan-hydrochlorothiazide (HYZAAR) 100-12.5 MG tablet; Take 1 tablet by mouth daily  Dispense: 90 tablet; Refill: 3    5. Family history of abdominal aortic aneurysm (AAA)  Family history AAA.  Screening ultrasound ordered  - US Aorta Medicare AAA Screening; Future    6. Multiple thyroid nodules  Previous benign biopsy but nodule #3 increased some in size compared to previous.  Due for follow-up thyroid ultrasound.  TSH normal  - US Thyroid; Future  - PRIMARY CARE FOLLOW-UP SCHEDULING; Future    7. Anxiety state  Controlled.  Continue fluoxetine  - PRIMARY CARE FOLLOW-UP SCHEDULING; Future  - FLUoxetine (PROZAC) 20 MG capsule; Take 1 capsule (20 mg) by mouth daily  Dispense: 90 capsule; Refill: 3      Patient has been advised of split billing requirements and indicates understanding:  Yes      COUNSELING:  Reviewed preventive health counseling, as reflected in patient instructions        She reports that she quit smoking about 53 years ago. Her smoking use included cigarettes. She has a 2.50 pack-year smoking history. She has never used smokeless tobacco.      Appropriate preventive services were discussed with this patient, including applicable screening as appropriate for cardiovascular disease, diabetes, osteopenia/osteoporosis, and glaucoma.  As appropriate for age/gender, discussed screening for colorectal cancer, prostate cancer, breast cancer, and cervical cancer. Checklist reviewing preventive services available has been given to the patient.    Reviewed patients plan of care and provided an AVS. The Basic Care Plan (routine screening as documented in Health Maintenance) for Oliva meets the Care Plan requirement. This Care Plan has been established and reviewed with the Patient.     PLAN:  Continue current medications  Prescriptions refilled.    Call  [phone] or use [employer] to schedule a future lab appointment  fasting in 1 year.   For fasting labs, please refrain from eating for 8 hours or more.   Drink 2 glasses of water before your lab appointment. It is fine to take your  oral medications on the morning of the lab test as usual  Schedule a follow up appointment with me in clinic a few days after these future labs are drawn to review results and other medical issues as necessary  Eye exam appointment in September. Please ask the eye clinic to fax us a report of your eye exam to [phone], attention Dr Traylor  Mammogram in September as scheduled  Abdominal ultrasound   and Thyroid ultrasound   [facility] will call you to schedule. If you have not heard from their schedulers within 2 business days, then call [phone] (radiology scheduling)  Colonoscopy screening next year  Covid  booster  and influenza/flu vaccinations in the  Fall (October).  May get through a  pharmacy or at clinic  Pt was informed regarding extra E&M billing for management of new or established medical issues not related to today's wellness/screening visit        Mitch Traylor MD  [facility]    Identified Health Risks:  I have reviewed Opioid Use Disorder and Substance Use Disorder risk factors and  NA

## 2023-08-28 NOTE — PATIENT INSTRUCTIONS
Continue current medications  Prescriptions refilled.    Call  [phone] or use [employer] to schedule a future lab appointment  fasting in 1 year.   For fasting labs, please refrain from eating for 8 hours or more.   Drink 2 glasses of water before your lab appointment. It is fine to take your  oral medications on the morning of the lab test as usual  Schedule a follow up appointment with me in clinic a few days after these future labs are drawn to review results and other medical issues as necessary  Eye exam appointment in September. Please ask the eye clinic to fax us a report of your eye exam to [phone], attention Dr Traylor  Mammogram in September as scheduled  Abdominal ultrasound   and Thyroid ultrasound   [facility] will call you to schedule. If you have not heard from their schedulers within 2 business days, then call [phone] (radiology scheduling)  Colonoscopy screening next year  Covid  booster  and influenza/flu vaccinations in the  Fall (October).  May get through a pharmacy or at clinic  Pt was informed regarding extra E&M billing for management of new or established medical issues not related to today's wellness/screening visit                Patient Education   Personalized Prevention Plan  You are due for the preventive services outlined below.  Your care team is available to assist you in scheduling these services.  If you have already completed any of these items, please share that information with your care team to update in your medical record.  Health Maintenance Due   Topic Date Due    Annual Wellness Visit  08/26/2023    Flu Vaccine (1) 09/01/2023     Bladder Training: Care Instructions  Your Care Instructions     Bladder training is used to treat urge incontinence and stress incontinence. Urge incontinence means that the need to urinate comes on so fast that you can't get to a toilet in time. Stress incontinence means that you leak urine because of pressure on your bladder.  For example, it may happen when you laugh, cough, or lift something heavy.  Bladder training can increase how long you can wait before you have to urinate. It can also help your bladder hold more urine. And it can give you better control over the urge to urinate.  It is important to remember that bladder training takes a few weeks to a few months to make a difference. You may not see results right away, but don't give up.  Follow-up care is a key part of your treatment and safety. Be sure to make and go to all appointments, and call your doctor if you are having problems. It's also a good idea to know your test results and keep a list of the medicines you take.  How can you care for yourself at home?  Work with your doctor to come up with a bladder training program that is right for you. You may use one or more of the following methods.  Delayed urination  In the beginning, try to keep from urinating for 5 minutes after you first feel the need to go.  While you wait, take deep, slow breaths to relax. Kegel exercises can also help you delay the need to go to the bathroom.  After some practice, when you can easily wait 5 minutes to urinate, try to wait 10 minutes before you urinate.  Slowly increase the waiting period until you are able to control when you have to urinate.  Scheduled urination  Empty your bladder when you first wake up in the morning.  Schedule times throughout the day when you will urinate.  Start by going to the bathroom every hour, even if you don't need to go.  Slowly increase the time between trips to the bathroom.  When you have found a schedule that works well for you, keep doing it.  If you wake up during the night and have to urinate, do it. Apply your schedule to waking hours only.  Kegel exercises  These tighten and strengthen pelvic muscles, which can help you control the flow of urine. (If doing these exercises causes pain, stop doing them and talk with your doctor.) To do Kegel  "exercises:  Squeeze your muscles as if you were trying not to pass gas. Or squeeze your muscles as if you were stopping the flow of urine. Your belly, legs, and buttocks shouldn't move.  Hold the squeeze for 3 seconds, then relax for 5 to 10 seconds.  Start with 3 seconds, then add 1 second each week until you are able to squeeze for 10 seconds.  Repeat the exercise 10 times a session. Do 3 to 8 sessions a day.  When should you call for help?  Watch closely for changes in your health, and be sure to contact your doctor if:    Your incontinence is getting worse.     You do not get better as expected.   Where can you learn more?  Go to [email or web address]  Enter V684 in the search box to learn more about \"Bladder Training: Care Instructions.\"  Current as of: March 1, 2023               Content Version: 13.7    3926-2494 [employer].   Care instructions adapted under license by your healthcare professional. If you have questions about a medical condition or this instruction, always ask your healthcare professional. [employer], [employer] disclaims any warranty or liability for your use of this information.         "

## 2023-09-08 ENCOUNTER — ANCILLARY PROCEDURE (OUTPATIENT)
Dept: ULTRASOUND IMAGING | Facility: CLINIC | Age: 73
End: 2023-09-08
Attending: INTERNAL MEDICINE
Payer: COMMERCIAL

## 2023-09-08 DIAGNOSIS — Z82.49 FAMILY HISTORY OF ABDOMINAL AORTIC ANEURYSM (AAA): ICD-10-CM

## 2023-09-08 DIAGNOSIS — E04.2 MULTIPLE THYROID NODULES: ICD-10-CM

## 2023-09-08 DIAGNOSIS — I10 ESSENTIAL HYPERTENSION: ICD-10-CM

## 2023-09-08 PROCEDURE — 76536 US EXAM OF HEAD AND NECK: CPT

## 2023-09-08 PROCEDURE — 76706 US ABDL AORTA SCREEN AAA: CPT

## 2023-09-11 ENCOUNTER — HOSPITAL ENCOUNTER (OUTPATIENT)
Dept: MAMMOGRAPHY | Facility: CLINIC | Age: 73
Discharge: HOME | End: 2023-09-11
Attending: INTERNAL MEDICINE | Admitting: INTERNAL MEDICINE
Payer: COMMERCIAL

## 2023-09-11 DIAGNOSIS — Z12.31 VISIT FOR SCREENING MAMMOGRAM: ICD-10-CM

## 2023-09-11 PROCEDURE — 77063 BREAST TOMOSYNTHESIS BI: CPT

## 2023-09-15 ENCOUNTER — MYC MEDICAL ADVICE (OUTPATIENT)
Dept: INTERNAL MEDICINE | Facility: CLINIC | Age: 73
End: 2023-09-15
Payer: COMMERCIAL

## 2023-09-15 DIAGNOSIS — E04.1 THYROID NODULE: Primary | ICD-10-CM

## 2023-09-29 ENCOUNTER — HOSPITAL ENCOUNTER (OUTPATIENT)
Dept: ULTRASOUND IMAGING | Facility: CLINIC | Age: 73
Discharge: HOME | End: 2023-09-29
Attending: INTERNAL MEDICINE | Admitting: INTERNAL MEDICINE
Payer: COMMERCIAL

## 2023-09-29 DIAGNOSIS — E04.1 THYROID NODULE: ICD-10-CM

## 2023-09-29 PROCEDURE — 88173 CYTOPATH EVAL FNA REPORT: CPT | Mod: TC | Performed by: INTERNAL MEDICINE

## 2023-09-29 PROCEDURE — 272N000431 US BIOPSY THYROID FINE NEEDLE ASPIRATION

## 2023-09-29 PROCEDURE — 250N000009 HC RX 250: Performed by: RADIOLOGY

## 2023-09-29 RX ORDER — LIDOCAINE HYDROCHLORIDE 10 MG/ML
5 INJECTION, SOLUTION EPIDURAL; INFILTRATION; INTRACAUDAL; PERINEURAL ONCE
Status: COMPLETED | OUTPATIENT
Start: 2023-09-29 | End: 2023-09-29

## 2023-09-29 RX ADMIN — LIDOCAINE HYDROCHLORIDE 5 ML: 10 INJECTION, SOLUTION EPIDURAL; INFILTRATION; INTRACAUDAL; PERINEURAL at 13:05

## 2023-10-02 LAB
PATH REPORT.COMMENTS IMP SPEC: NORMAL
PATH REPORT.COMMENTS IMP SPEC: NORMAL
PATH REPORT.FINAL DX SPEC: NORMAL
PATH REPORT.GROSS SPEC: NORMAL
PATH REPORT.MICROSCOPIC SPEC OTHER STN: NORMAL

## 2023-10-02 PROCEDURE — 88173 CYTOPATH EVAL FNA REPORT: CPT | Mod: 26 | Performed by: PATHOLOGY

## 2023-10-04 ENCOUNTER — MYC MEDICAL ADVICE (OUTPATIENT)
Dept: INTERNAL MEDICINE | Facility: CLINIC | Age: 73
End: 2023-10-04
Payer: COMMERCIAL

## 2023-11-01 DIAGNOSIS — I10 ESSENTIAL HYPERTENSION: ICD-10-CM

## 2023-11-01 RX ORDER — LOSARTAN POTASSIUM AND HYDROCHLOROTHIAZIDE 12.5; 1 MG/1; MG/1
1 TABLET ORAL DAILY
Qty: 90 TABLET | Refills: 3 | Status: SHIPPED | OUTPATIENT
Start: 2023-11-01 | End: 2024-09-16

## 2023-11-03 DIAGNOSIS — R15.9 FULL INCONTINENCE OF FECES: ICD-10-CM

## 2023-11-03 DIAGNOSIS — E78.5 HYPERLIPIDEMIA WITH TARGET LDL LESS THAN 130: ICD-10-CM

## 2023-11-03 RX ORDER — PRAVASTATIN SODIUM 40 MG
TABLET ORAL
Qty: 90 TABLET | Refills: 3 | OUTPATIENT
Start: 2023-11-03

## 2024-07-10 ENCOUNTER — VIRTUAL VISIT (OUTPATIENT)
Dept: INTERNAL MEDICINE | Facility: CLINIC | Age: 74
End: 2024-07-10
Payer: COMMERCIAL

## 2024-07-10 DIAGNOSIS — R15.9 FULL INCONTINENCE OF FECES: Primary | ICD-10-CM

## 2024-07-10 PROCEDURE — 99213 OFFICE O/P EST LOW 20 MIN: CPT | Mod: 95 | Performed by: INTERNAL MEDICINE

## 2024-07-10 NOTE — PROGRESS NOTES
Lesley is a 74 year old who is being evaluated via a billable video visit.    How would you like to obtain your AVS? MyChart  If the video visit is dropped, the invitation should be resent by: Text to cell phone: [phone]  Will anyone else be joining your video visit? No      ASSESSMENT:    1. Full incontinence of feces  2 episodes have occurred.  First 1 4 months ago and most recent 2 weeks ago.  Bowel movements currently normal and occurring every other day.  Labs as ordered to rule out pancreatic insufficiency or infectious issue.  If loose stool at the time of lab study, will also collect C. difficile.  Stool formed however, lab will not run that test.  Patient to see colorectal surgery to consider repeat colonoscopy earlier than currently recommended 2026 study and to give opinion regarding possible manometry evaluation versus other.  Counseled to reduce caffeine intake and will also add Metamucil to help bulk stool.  Patient denies current back pain or lower extremity weakness/numbness to suggest sacral nerve issue  - Enteric Bacteria and Virus Panel PCR; Future  - Elastase Fecal; Future  - C. difficile Toxin B PCR with reflex to C. difficile Antigen and Toxins A/B EIA; Future  - Adult Colorectal Surgery  Referral - [facility] ([facility]); Future      PLAN:  Reduce caffeine intake by half  Metamucil 1 to 2 tablespoons daily as needed to help thicken stools.  Labs as ordered including stool bacterial  study  Referral to colorectal surgery. See them if stool studies negative and symptoms recurrent. ? possible future pelvic floor therapy      Video-Visit Details    Type of service:  Video Visit    Video Start Time: 5:20pm    Video End Time:5:42pm    Originating Location (pt. Location): Home    Distant Location (provider location):  [facility]     Platform used for Video Visit: Ashvin Boudreaux   Lesley is a 74 year old, presenting for the  following health issues:  Incontinence  Pt states this happened once about 4 months ago and did not happen again until the last 2 weeks it has happened twice. They have no warning about needing to use the bathroom      History of Present Illness       Reason for visit:  Bowel issue  Symptom onset:  1-2 weeks ago  Symptoms include:  Incontinence  Symptom intensity:  Moderate  Symptom progression:  Worsening  Had these symptoms before:  No  What makes it worse:  None  What makes it better:  None    She eats 2-3 servings of fruits and vegetables daily.She consumes 0 sweetened beverage(s) daily.She exercises with enough effort to increase her heart rate 30 to 60 minutes per day.  She exercises with enough effort to increase her heart rate 5 days per week.   She is taking medications regularly.       Most recent lab results reviewed with pt.      HPI:  4 months ago and  2 weeks ago, patient had acute bowel incontinence.  Denies seeing blood in stools.  No acute weakness in lower extremities or numbness.  Denies back pain.  Stools generally normal now and having a bowel movement every other day.  Does notice occasional mild mucus present rectal area.  Last colonoscopy August 2019 that showed one 4mm polyp and some internal hemorrhoids.  Was recommended patient have colonoscopy again August 2024 though with newer guidelines from 2021 given after that last colonoscopy, one small polyp will require repeat colonoscopy 7 years later (2026) instead     Additional ROS:   Constitutional, HEENT, Cardiovascular, Pulmonary, GI and , Neuro, MSK and Psych review of systems/symptoms are otherwise negative or unchanged from previous, except as noted above.           Objective :  No vitals obtained today    Physical Exam:  GENERAL: healthy, alert and no distress  EYES: Eyes grossly normal to inspection, conjunctivae and sclerae normal  RESP: no audible wheeze, cough, or visible cyanosis.  No visible retractions or increased work of  breathing.  Able to speak fully in complete sentences.  NEURO: Cranial nerves grossly intact, mentation intact and speech normal  PSYCH: mentation appears normal, affect normal/bright, judgement and insight intact, normal speech and appearance well-groomed       Mitch Traylor MD  Internal Medicine Department  [facility]  Internal Medicine Department      (Chart documentation was completed, in part, with [employer] voice-recognition software. Even though reviewed, some grammatical, spelling, and word errors may remain.)

## 2024-07-22 LAB
ADV 40+41 DNA STL QL NAA+NON-PROBE: NEGATIVE
ASTRO TYP 1-8 RNA STL QL NAA+NON-PROBE: NEGATIVE
C CAYETANENSIS DNA STL QL NAA+NON-PROBE: NEGATIVE
CAMPYLOBACTER DNA SPEC NAA+PROBE: NEGATIVE
CRYPTOSP DNA STL QL NAA+NON-PROBE: NEGATIVE
E COLI O157 DNA STL QL NAA+NON-PROBE: NORMAL
E HISTOLYT DNA STL QL NAA+NON-PROBE: NEGATIVE
EAEC ASTA GENE ISLT QL NAA+PROBE: NEGATIVE
EC STX1+STX2 GENES STL QL NAA+NON-PROBE: NEGATIVE
EPEC EAE GENE STL QL NAA+NON-PROBE: NEGATIVE
ETEC LTA+ST1A+ST1B TOX ST NAA+NON-PROBE: NEGATIVE
G LAMBLIA DNA STL QL NAA+NON-PROBE: NEGATIVE
NOROVIRUS GI+II RNA STL QL NAA+NON-PROBE: NEGATIVE
P SHIGELLOIDES DNA STL QL NAA+NON-PROBE: NEGATIVE
RVA RNA STL QL NAA+NON-PROBE: NEGATIVE
SALMONELLA SP RPOD STL QL NAA+PROBE: NEGATIVE
SAPO I+II+IV+V RNA STL QL NAA+NON-PROBE: NEGATIVE
SHIGELLA SP+EIEC IPAH ST NAA+NON-PROBE: NEGATIVE
V CHOLERAE DNA SPEC QL NAA+PROBE: NEGATIVE
VIBRIO DNA SPEC NAA+PROBE: NEGATIVE
Y ENTEROCOL DNA STL QL NAA+PROBE: NEGATIVE

## 2024-07-22 PROCEDURE — 87507 IADNA-DNA/RNA PROBE TQ 12-25: CPT | Performed by: INTERNAL MEDICINE

## 2024-07-22 PROCEDURE — 82653 EL-1 FECAL QUANTITATIVE: CPT | Performed by: INTERNAL MEDICINE

## 2024-07-24 LAB — ELASTASE PANC STL-MCNT: 512 UG/G

## 2024-08-05 NOTE — PATIENT INSTRUCTIONS
Reduce caffeine intake by half  Metamucil 1 to 2 tablespoons daily as needed to help thicken stools.  Labs as ordered including stool bacterial  study  Referral to colorectal surgery. See them if stool studies negative and symptoms recurrent. ? possible future pelvic floor therapy

## 2024-08-26 ENCOUNTER — MYC REFILL (OUTPATIENT)
Dept: INTERNAL MEDICINE | Facility: CLINIC | Age: 74
End: 2024-08-26
Payer: COMMERCIAL

## 2024-08-26 ENCOUNTER — MYC MEDICAL ADVICE (OUTPATIENT)
Dept: INTERNAL MEDICINE | Facility: CLINIC | Age: 74
End: 2024-08-26
Payer: COMMERCIAL

## 2024-08-26 DIAGNOSIS — B00.9 HERPES SIMPLEX INFECTION OF SKIN: ICD-10-CM

## 2024-08-26 RX ORDER — VALACYCLOVIR HYDROCHLORIDE 500 MG/1
500 TABLET, FILM COATED ORAL 2 TIMES DAILY
Qty: 30 TABLET | Refills: 0 | OUTPATIENT
Start: 2024-08-26

## 2024-08-26 NOTE — TELEPHONE ENCOUNTER
Per further chart review, pt receives this medication on an as-needed basis with rash flares.   Routing to PCP for review.   Please inform staff if pt will need an appt to address.     Thank you,  Rita Mejia RN

## 2024-08-26 NOTE — TELEPHONE ENCOUNTER
Clinic RN: Please contact patient because this medication was prescribed for an acute condition. Confirm current symptoms/need for medication and possible need for appointment. If necessary, document reason and route refill encounter to provider for approval or denial.

## 2024-08-27 ENCOUNTER — TRANSFERRED RECORDS (OUTPATIENT)
Dept: HEALTH INFORMATION MANAGEMENT | Facility: CLINIC | Age: 74
End: 2024-08-27
Payer: COMMERCIAL

## 2024-08-27 RX ORDER — VALACYCLOVIR HYDROCHLORIDE 500 MG/1
TABLET, FILM COATED ORAL
Qty: 30 TABLET | Refills: 1 | Status: SHIPPED | OUTPATIENT
Start: 2024-08-27 | End: 2024-09-26

## 2024-08-28 ENCOUNTER — TRANSFERRED RECORDS (OUTPATIENT)
Dept: HEALTH INFORMATION MANAGEMENT | Facility: CLINIC | Age: 74
End: 2024-08-28
Payer: COMMERCIAL

## 2024-08-29 ENCOUNTER — NURSE TRIAGE (OUTPATIENT)
Dept: INTERNAL MEDICINE | Facility: CLINIC | Age: 74
End: 2024-08-29
Payer: COMMERCIAL

## 2024-08-29 ENCOUNTER — APPOINTMENT (OUTPATIENT)
Dept: CT IMAGING | Facility: CLINIC | Age: 74
End: 2024-08-29
Attending: EMERGENCY MEDICINE
Payer: COMMERCIAL

## 2024-08-29 ENCOUNTER — HOSPITAL ENCOUNTER (EMERGENCY)
Facility: CLINIC | Age: 74
Discharge: HOME | End: 2024-08-29
Attending: EMERGENCY MEDICINE | Admitting: EMERGENCY MEDICINE
Payer: COMMERCIAL

## 2024-08-29 VITALS
TEMPERATURE: 98.2 F | RESPIRATION RATE: 18 BRPM | BODY MASS INDEX: 22.2 KG/M2 | HEART RATE: 73 BPM | WEIGHT: 130 LBS | DIASTOLIC BLOOD PRESSURE: 76 MMHG | HEIGHT: 64 IN | SYSTOLIC BLOOD PRESSURE: 168 MMHG | OXYGEN SATURATION: 95 %

## 2024-08-29 DIAGNOSIS — E27.9 ADRENAL NODULE (H): ICD-10-CM

## 2024-08-29 DIAGNOSIS — R10.9 ABDOMINAL CRAMPING: ICD-10-CM

## 2024-08-29 DIAGNOSIS — R68.83 CHILLS: ICD-10-CM

## 2024-08-29 LAB
ALBUMIN SERPL BCG-MCNC: 4.6 G/DL (ref 3.5–5.2)
ALBUMIN UR-MCNC: 20 MG/DL
ALP SERPL-CCNC: 80 U/L (ref 40–150)
ALT SERPL W P-5'-P-CCNC: 20 U/L (ref 0–50)
ANION GAP SERPL CALCULATED.3IONS-SCNC: 11 MMOL/L (ref 7–15)
APPEARANCE UR: CLEAR
AST SERPL W P-5'-P-CCNC: 21 U/L (ref 0–45)
BASOPHILS # BLD AUTO: 0 10E3/UL (ref 0–0.2)
BASOPHILS NFR BLD AUTO: 0 %
BILIRUB SERPL-MCNC: 0.6 MG/DL
BILIRUB UR QL STRIP: NEGATIVE
BUN SERPL-MCNC: 13.1 MG/DL (ref 8–23)
CALCIUM SERPL-MCNC: 9.7 MG/DL (ref 8.8–10.4)
CHLORIDE SERPL-SCNC: 99 MMOL/L (ref 98–107)
COLOR UR AUTO: ABNORMAL
CREAT SERPL-MCNC: 0.7 MG/DL (ref 0.51–0.95)
EGFRCR SERPLBLD CKD-EPI 2021: 90 ML/MIN/1.73M2
EOSINOPHIL # BLD AUTO: 0 10E3/UL (ref 0–0.7)
EOSINOPHIL NFR BLD AUTO: 0 %
ERYTHROCYTE [DISTWIDTH] IN BLOOD BY AUTOMATED COUNT: 12.5 % (ref 10–15)
FLUAV RNA SPEC QL NAA+PROBE: NEGATIVE
FLUBV RNA RESP QL NAA+PROBE: NEGATIVE
GLUCOSE SERPL-MCNC: 95 MG/DL (ref 70–99)
GLUCOSE UR STRIP-MCNC: NEGATIVE MG/DL
HCO3 SERPL-SCNC: 25 MMOL/L (ref 22–29)
HCT VFR BLD AUTO: 40.3 % (ref 35–47)
HGB BLD-MCNC: 13.2 G/DL (ref 11.7–15.7)
HGB UR QL STRIP: NEGATIVE
IMM GRANULOCYTES # BLD: 0 10E3/UL
IMM GRANULOCYTES NFR BLD: 0 %
KETONES UR STRIP-MCNC: 10 MG/DL
LEUKOCYTE ESTERASE UR QL STRIP: ABNORMAL
LYMPHOCYTES # BLD AUTO: 0.7 10E3/UL (ref 0.8–5.3)
LYMPHOCYTES NFR BLD AUTO: 13 %
MCH RBC QN AUTO: 31.7 PG (ref 26.5–33)
MCHC RBC AUTO-ENTMCNC: 32.8 G/DL (ref 31.5–36.5)
MCV RBC AUTO: 97 FL (ref 78–100)
MONOCYTES # BLD AUTO: 0.2 10E3/UL (ref 0–1.3)
MONOCYTES NFR BLD AUTO: 3 %
MUCOUS THREADS #/AREA URNS LPF: PRESENT /LPF
NEUTROPHILS # BLD AUTO: 4.7 10E3/UL (ref 1.6–8.3)
NEUTROPHILS NFR BLD AUTO: 84 %
NITRATE UR QL: NEGATIVE
NRBC # BLD AUTO: 0 10E3/UL
NRBC BLD AUTO-RTO: 0 /100
PH UR STRIP: 5.5 [PH] (ref 5–7)
PLATELET # BLD AUTO: 172 10E3/UL (ref 150–450)
POTASSIUM SERPL-SCNC: 4 MMOL/L (ref 3.4–5.3)
PROT SERPL-MCNC: 7 G/DL (ref 6.4–8.3)
RBC # BLD AUTO: 4.17 10E6/UL (ref 3.8–5.2)
RBC URINE: 1 /HPF
RSV RNA SPEC NAA+PROBE: NEGATIVE
SARS-COV-2 RNA RESP QL NAA+PROBE: NEGATIVE
SODIUM SERPL-SCNC: 135 MMOL/L (ref 135–145)
SP GR UR STRIP: 1.02 (ref 1–1.03)
SQUAMOUS EPITHELIAL: <1 /HPF
UROBILINOGEN UR STRIP-MCNC: NORMAL MG/DL
WBC # BLD AUTO: 5.6 10E3/UL (ref 4–11)
WBC URINE: 5 /HPF

## 2024-08-29 PROCEDURE — 80053 COMPREHEN METABOLIC PANEL: CPT | Performed by: EMERGENCY MEDICINE

## 2024-08-29 PROCEDURE — 250N000009 HC RX 250: Performed by: EMERGENCY MEDICINE

## 2024-08-29 PROCEDURE — 87040 BLOOD CULTURE FOR BACTERIA: CPT | Performed by: EMERGENCY MEDICINE

## 2024-08-29 PROCEDURE — 74177 CT ABD & PELVIS W/CONTRAST: CPT

## 2024-08-29 PROCEDURE — 87637 SARSCOV2&INF A&B&RSV AMP PRB: CPT | Performed by: EMERGENCY MEDICINE

## 2024-08-29 PROCEDURE — 85025 COMPLETE CBC W/AUTO DIFF WBC: CPT | Performed by: EMERGENCY MEDICINE

## 2024-08-29 PROCEDURE — 81001 URINALYSIS AUTO W/SCOPE: CPT | Performed by: EMERGENCY MEDICINE

## 2024-08-29 PROCEDURE — 99285 EMERGENCY DEPT VISIT HI MDM: CPT | Mod: 25

## 2024-08-29 PROCEDURE — 36415 COLL VENOUS BLD VENIPUNCTURE: CPT | Performed by: EMERGENCY MEDICINE

## 2024-08-29 PROCEDURE — 250N000011 HC RX IP 250 OP 636: Performed by: EMERGENCY MEDICINE

## 2024-08-29 RX ORDER — IOPAMIDOL 755 MG/ML
65 INJECTION, SOLUTION INTRAVASCULAR ONCE
Status: COMPLETED | OUTPATIENT
Start: 2024-08-29 | End: 2024-08-29

## 2024-08-29 RX ORDER — ONDANSETRON 4 MG/1
4 TABLET, ORALLY DISINTEGRATING ORAL EVERY 6 HOURS PRN
Qty: 10 TABLET | Refills: 0 | Status: SHIPPED | OUTPATIENT
Start: 2024-08-29 | End: 2024-09-01

## 2024-08-29 RX ADMIN — IOPAMIDOL 65 ML: 755 INJECTION, SOLUTION INTRAVENOUS at 17:54

## 2024-08-29 RX ADMIN — SODIUM CHLORIDE 60 ML: 9 INJECTION, SOLUTION INTRAVENOUS at 17:55

## 2024-08-29 ASSESSMENT — ACTIVITIES OF DAILY LIVING (ADL)
ADLS_ACUITY_SCORE: 35

## 2024-08-29 ASSESSMENT — COLUMBIA-SUICIDE SEVERITY RATING SCALE - C-SSRS
2. HAVE YOU ACTUALLY HAD ANY THOUGHTS OF KILLING YOURSELF IN THE PAST MONTH?: NO
6. HAVE YOU EVER DONE ANYTHING, STARTED TO DO ANYTHING, OR PREPARED TO DO ANYTHING TO END YOUR LIFE?: NO
1. IN THE PAST MONTH, HAVE YOU WISHED YOU WERE DEAD OR WISHED YOU COULD GO TO SLEEP AND NOT WAKE UP?: NO

## 2024-08-29 NOTE — TELEPHONE ENCOUNTER
Agree with pt contacting  [facility] now since I don't have any record of colonoscopy procedure/findings and sx started after colonoscopy performed by them 2 days ago

## 2024-08-29 NOTE — DISCHARGE INSTRUCTIONS
I recommend altering dose of Tylenol, ibuprofen and lots of fluids stable hydrated.  You can use the Zofran as needed for nausea.  Should you develop fever, worsening abdominal pain persistent shaking chills, you should return.  We will call you if your blood culture is positive.

## 2024-08-29 NOTE — ED TRIAGE NOTES
Pt reports having a colonoscopy on Tuesday. Pt states the procedure needed to be stopped midway through due to her vomiting. Pt states since she has had increased abdominal pain along with generalized body aches and chills.      Triage Assessment (Adult)       Row Name 08/29/24 1509          Triage Assessment    Airway WDL WDL        Respiratory WDL    Respiratory WDL WDL        Skin Circulation/Temperature WDL    Skin Circulation/Temperature WDL WDL        Cardiac WDL    Cardiac WDL WDL        Peripheral/Neurovascular WDL    Peripheral Neurovascular WDL WDL        Cognitive/Neuro/Behavioral WDL    Cognitive/Neuro/Behavioral WDL WDL

## 2024-08-29 NOTE — ED PROVIDER NOTES
Emergency Department Note      History of Present Illness     Chief Complaint   Chills and Abdominal Pain      HPI   Oliva Kearney is a 74 year old female past medical history significant for hypertension, anxiety, hyperlipidemia presenting for evaluation of lower abdominal pain and chills.  She reports having a colonoscopy on Monday that had to be aborted due to intraoperative vomiting which has happened to her in the past they decided to stop instead of continuing as they had in the past with last colonoscopy 5 years ago.  She then developed sudden shaking chills 2 days ago then was fine yesterday today developed left lower abdominal pain.  She denies nausea or vomiting.  No actual fever.  She does report that she is achy.    Independent Historian   None    Review of External Notes   None    Past Medical History     Medical History and Problem List   Past Medical History:   Diagnosis Date    Anemia, unspecified     Anxiety state, unspecified     Basal cell carcinoma 9/08, 8/09    Depressive disorder     Generalized osteoarthrosis, unspecified site     Hyperlipidemia LDL goal < 130     Lung nodules Sept 2016    Multiple thyroid nodules 9/13/2019    Osteopenia 12/08    Other and unspecified anterior pituitary hyperfunction     Unspecified breast disorder 1997 and 1999    Unspecified essential hypertension        Medications   ondansetron (ZOFRAN ODT) 4 MG ODT tab  FLUoxetine (PROZAC) 20 MG capsule  losartan-hydrochlorothiazide (HYZAAR) 100-12.5 MG tablet  pravastatin (PRAVACHOL) 40 MG tablet  valACYclovir (VALTREX) 500 MG tablet        Surgical History   Past Surgical History:   Procedure Laterality Date    BREAST BIOPSY, CORE RT/LT  1997, 1999    2 biopsies, atypical ductal hyperplasia    COSMETIC SURGERY  facelift 2009    [facility] ENLARGE BREAST WITH IMPLANT      ORTHOPEDIC SURGERY  2005    partial L knee replacement    SALPINGO OOPHORECTOMY,R/L/DIMPLE  1970    right SO    ZZC APPENDECTOMY  1970    [facility]  "COLONOSCOPY THRU STOMA, DIAGNOSTIC  4/16/09    [facility]       Physical Exam     Patient Vitals for the past 24 hrs:   BP Temp Temp src Pulse Resp SpO2 Height Weight   08/29/24 1833 (!) 180/78 -- -- -- 18 96 % -- --   08/29/24 1828 -- -- -- -- -- 95 % -- --   08/29/24 1824 -- -- -- -- -- 96 % -- --   08/29/24 1508 (!) 160/69 98.2  F (36.8  C) Oral 71 18 98 % 1.626 m (5' 4\") 59 kg (130 lb)     Physical Exam  Constitutional: Alert, attentive, GCS 15   Eyes: EOM are normal, anicteric, conjugate gaze  CV: distal extremities warm, well perfused  Chest: Non-labored breathing on RA  GI: Focal left lower quadrant tenderness no distension. No guarding or rebound.    Neurological: Alert, attentive, moving all extremities equally.   Skin: Skin is warm and dry.      Diagnostics     Lab Results   Labs Ordered and Resulted from Time of ED Arrival to Time of ED Departure   ROUTINE UA WITH MICROSCOPIC - Abnormal       Result Value    Color Urine Light Yellow      Appearance Urine Clear      Glucose Urine Negative      Bilirubin Urine Negative      Ketones Urine 10 (*)     Specific Gravity Urine 1.024      Blood Urine Negative      pH Urine 5.5      Protein Albumin Urine 20 (*)     Urobilinogen Urine Normal      Nitrite Urine Negative      Leukocyte Esterase Urine Small (*)     Mucus Urine Present (*)     RBC Urine 1      WBC Urine 5      Squamous Epithelials Urine <1     CBC WITH PLATELETS AND DIFFERENTIAL - Abnormal    WBC Count 5.6      RBC Count 4.17      Hemoglobin 13.2      Hematocrit 40.3      MCV 97      MCH 31.7      MCHC 32.8      RDW 12.5      Platelet Count 172      % Neutrophils 84      % Lymphocytes 13      % Monocytes 3      % Eosinophils 0      % Basophils 0      % Immature Granulocytes 0      NRBCs per 100 WBC 0      Absolute Neutrophils 4.7      Absolute Lymphocytes 0.7 (*)     Absolute Monocytes 0.2      Absolute Eosinophils 0.0      Absolute Basophils 0.0      Absolute Immature Granulocytes 0.0      Absolute " NRBCs 0.0     COMPREHENSIVE METABOLIC PANEL - Normal    Sodium 135      Potassium 4.0      Carbon Dioxide (CO2) 25      Anion Gap 11      Urea Nitrogen 13.1      Creatinine 0.70      GFR Estimate 90      Calcium 9.7      Chloride 99      Glucose 95      Alkaline Phosphatase 80      AST 21      ALT 20      Protein Total 7.0      Albumin 4.6      Bilirubin Total 0.6     INFLUENZA A/B, RSV, & SARS-COV2 PCR   BLOOD CULTURE       Imaging   Abd/pelvis CT,  IV  contrast only TRAUMA / AAA   Final Result   IMPRESSION:    1.  No acute abnormality in the abdomen or pelvis.   2.  Indeterminant left adrenal nodule measuring 1.5 cm. Follow-up routine/nonemergent adrenal protocol CT recommended.          Independent Interpretation   I personally reviewed her CT scan, I see no evidence of bowel obstruction or free air.    ED Course      Medications Administered   Medications   iopamidol (ISOVUE-370) solution 65 mL (65 mLs Intravenous $Given 8/29/24 4595)   Saline Flush (60 mLs Intravenous $Given 8/29/24 2348)       Procedures   Procedures     Discussion of Management   None    ED Course        Additional Documentation  None    Medical Decision Making / Diagnosis     MDM   Oliva Kearney is a 74 year old female past medical history seen for hypertension presenting for sudden onset chills and severe left lower abdominal pain this is in the setting of patient having an incomplete colonoscopy 3 days ago, it had to be stopped due to IntraOp operative vomiting.  On exam she did have focal left lower quadrant tenderness prompting CT imaging with concern for potential colonic perforation versus diverticulitis however this CT was unrevealing except for adrenal nodule.  Her screening labs are unremarkable including a UA.  Due to her good description of rigors, did send a blood culture as a precaution though she does not have a fever or leukocytosis I do not think she warrants empiric antibiotic coverage, she does agree to return  should you have worsening symptoms.  I recommended Tylenol, ibuprofen, adequate hydration and Zofran for nausea.    Disposition   The patient was discharged.     Diagnosis     ICD-10-CM    1. Abdominal cramping  R10.9       2. Chills  R68.83            Discharge Medications   New Prescriptions    ONDANSETRON (ZOFRAN ODT) 4 MG ODT TAB    Take 1 tablet (4 mg) by mouth every 6 hours as needed.         Dwayne Barbosa MD  [employer]  6:56 PM 08/29/24        Dwayne Barbosa MD  08/29/24 4986

## 2024-08-29 NOTE — TELEPHONE ENCOUNTER
Reviewed with patient. She did call [facility] and is waiting for call back. She states symptoms have been improving gradually since last night so she is hopeful they will resolve on their own but interested in GI recommendations, if any. Patient with no further questions or concerns.     Yoli Coburn RN

## 2024-08-29 NOTE — TELEPHONE ENCOUNTER
Nurse Triage SBAR    Is this a 2nd Level Triage? NO    Situation: Possible side effects from colonoscopy.    Background: Patient had her colonoscopy on 8/27 and last night developed symptoms. States the colonoscopy had to be stopped due to vomiting.    Assessment: States she had chills last night that have subsided. Now has body aches all over and mild abdominal discomfort. States she is passing gas and able to drink fluids. Denies any bleeding, bloating or actual pain.    Protocol Recommended Disposition:   Home Care    Recommendation: Advised home care and OTC medications if tolerated for chills. Also instructed to contact GI and inform them of symptoms. Instructed to call back if any fevers, severe abdominal pain or bleeding occur. Patient verbalized understanding and requests a message also be sent to Dr Traylor to inform him as well.    Routed to provider    Does the patient meet one of the following criteria for ADS visit consideration? 16+ years old, with an [facility] PCP     TIP  Providers, please consider if this condition is appropriate for management at one of our Acute and Diagnostic Services sites.     If patient is a good candidate, please use dotphrase <dot>triageresponse and select Refer to ADS to document.     Tuesday, had to stop due to vomiting, chills yesterday, now body aches, advil ineffective  Reason for Disposition   Abdominal cramping, bloating, and passing gas after colonoscopy, questions about    Additional Information   Negative: Patient wants to be seen.   Negative: MILD to MODERATE vomiting (e.g., 1 to 5 times a day) and lasting > 24 hours   Negative: Any rectal bleeding occurring > 24 hours after colonoscopy   Negative: Caller has NON-URGENT question and triager unable to answer question   Negative: MILD-MODERATE abdomen pain (e.g., does not interfere with normal activities) and pain comes and goes (cramps) lasting > 48 hours   Negative: Abdominal bloating, cramping, nausea, or vomiting while  "drinking bowel prep and CANNOT finish bowel prep and has tried recommended Care Advice   Negative: Caller has URGENT question or concern and triager unable to answer question   Negative: Fever > 100.4 F (38.0 C)   Negative: SEVERE vomiting (e.g., 6 or more times/day)   Negative: MODERATE rectal bleeding (small blood clots, passing blood without stool, or toilet water turns red) and more than once   Negative: MILD-MODERATE abdomen pain and constant and present > 2 hours   Negative: Drinking very little and dehydration suspected (e.g., no urine > 12 hours, very dry mouth, very lightheaded)   Negative: Patient sounds very sick or weak to the triager   Negative: SEVERE abdomen pain (e.g., excruciating)   Negative: SEVERE rectal bleeding (large blood clots; on and off, or constant bleeding)   Negative: SEVERE dizziness (e.g., unable to stand, requires support to walk, feels like passing out now)   Negative: Breathing difficulty   Negative: Chest pain   Negative: Abdomen pain is main concern and started > 3 days (72 hours) after colonoscopy and Female   Negative: Abdomen pain is main concern and started > 3 days (72 hours) after colonoscopy and male   Negative: Vomiting is main concern and started > 3 days after colonoscopy   Negative: Shock suspected (e.g., cold/pale/clammy skin, too weak to stand, low BP, rapid pulse)   Negative: Difficult to awaken or acting confused (e.g., disoriented, slurred speech)   Negative: Passed out (i.e., lost consciousness, collapsed and was not responding)   Negative: Sounds like a life-threatening emergency to the triager    Answer Assessment - Initial Assessment Questions  1. DATE/TIME: \"When did you have your colonoscopy?\"       8/27  2. MAIN CONCERN: \"What is your main concern right now?\" \"What questions do you have?\"      Body aches and abdominal   3. ABDOMEN PAIN: \"Are you having any abdomen (belly or stomach) pain?\" If Yes, ask: \"How bad is it?\" (e.g., Scale 1-10; mild, moderate, " "severe).     - MILD (1-3): doesn't interfere with normal activities, abdomen soft and not tender to touch      - MODERATE (4-7): interferes with normal activities or awakens from sleep, abdomen tender to touch      - SEVERE (8-10): excruciating pain, doubled over, unable to do any normal activities        Mild, but everywhere  4. OTHER SYMPTOMS: \"What other symptoms are you having?\" (e.g., rectal bleeding, bloating or feeling gassy, passing gas, vomiting, dizziness, fever).      Bloating and gas  5. ONSET: \"When did your symptoms start?\"      Last night  6. PATTERN: \"Is the symptom(s) constant or does it come and go?\" \"Is your symptom(s) getting worse, better, or staying the same?\"      constant    Protocols used: Colonoscopy Symptoms and [id number]    "

## 2024-08-30 ENCOUNTER — OFFICE VISIT (OUTPATIENT)
Dept: INTERNAL MEDICINE | Facility: CLINIC | Age: 74
End: 2024-08-30
Payer: COMMERCIAL

## 2024-08-30 VITALS
RESPIRATION RATE: 16 BRPM | HEART RATE: 68 BPM | WEIGHT: 137.2 LBS | BODY MASS INDEX: 24.31 KG/M2 | HEIGHT: 63 IN | DIASTOLIC BLOOD PRESSURE: 66 MMHG | TEMPERATURE: 98 F | OXYGEN SATURATION: 97 % | SYSTOLIC BLOOD PRESSURE: 116 MMHG

## 2024-08-30 DIAGNOSIS — E78.5 HYPERLIPIDEMIA WITH TARGET LDL LESS THAN 130: ICD-10-CM

## 2024-08-30 DIAGNOSIS — Z12.11 SCREEN FOR COLON CANCER: ICD-10-CM

## 2024-08-30 DIAGNOSIS — Z01.818 PREOP GENERAL PHYSICAL EXAM: Primary | ICD-10-CM

## 2024-08-30 DIAGNOSIS — I10 ESSENTIAL HYPERTENSION: ICD-10-CM

## 2024-08-30 PROBLEM — M19.049 CMC ARTHRITIS: Status: ACTIVE | Noted: 2023-08-30

## 2024-08-30 LAB
CHOLEST SERPL-MCNC: 220 MG/DL
HDLC SERPL-MCNC: 75 MG/DL
LDLC SERPL CALC-MCNC: 114 MG/DL
NONHDLC SERPL-MCNC: 145 MG/DL
TRIGL SERPL-MCNC: 154 MG/DL

## 2024-08-30 PROCEDURE — 80061 LIPID PANEL: CPT | Performed by: INTERNAL MEDICINE

## 2024-08-30 PROCEDURE — 93000 ELECTROCARDIOGRAM COMPLETE: CPT | Performed by: INTERNAL MEDICINE

## 2024-08-30 PROCEDURE — 99213 OFFICE O/P EST LOW 20 MIN: CPT | Performed by: INTERNAL MEDICINE

## 2024-08-30 ASSESSMENT — PAIN SCALES - GENERAL: PAINLEVEL: NO PAIN (0)

## 2024-08-30 NOTE — PROGRESS NOTES
Preoperative Evaluation  [address] [address]  Phone: [phone]  Primary Provider: Mitch Traylor MD  Pre-op Performing Provider: Mani Miller MD  Aug 30, 2024           8/30/2024   Surgical Information   What procedure is being done? colonoscopy   Facility or Hospital where procedure/surgery will be performed: [facility]   Who is doing the procedure / surgery? Dr. Allyson Farley   Date of surgery / procedure: sept 13   Time of surgery / procedure: 845 am   Where do you plan to recover after surgery? at home with family      Fax number for surgical facility: [phone]     Assessment & Plan     The proposed surgical procedure is considered LOW risk.    1. Preop general physical exam    2. Screen for colon cancer    3. Essential hypertension    4. Hyperlipidemia with target LDL less than 130                Risks and Recommendations  The patient has the following additional risks and recommendations for perioperative complications:   - Nausea with  Proprofol    Antiplatelet or Anticoagulation Medication Instructions   - Patient is on no antiplatelet or anticoagulation medications.    Additional Medication Instructions   - ACE/ARB: DO NOT TAKE on day of surgery (minimum 11 hours for general anesthesia).   - Diuretics: DO NOT TAKE on the day of surgery.    Recommendation  Approval given to proceed with proposed procedure, without further diagnostic evaluation.    Kanika Sutton is a 74 year old, presenting for the following:  Pre-Op Exam          8/30/2024    12:43 PM   Additional Questions   Roomed by Jazlyn VERGARA related to upcoming procedure: colonoscopy at hospital.      Attempted colonoscopy 4 days ago at the [facility] outpatient clinic.  Part way during the procedure, the patient developed nausea and vomiting.  They were only able to complete roughly half of the colonoscopy before the  procedure was aborted.  1 day later, she developed fevers and chills and went to the emergency room.  Labs, CT scan all normal without specific evidence for infection, aspiration, perforation.  Symptoms were short-lived and resolved without specific intervention.  She has been rescheduled for a colonoscopy to be done at the hospital in 2 weeks.  Has a history of nausea with the next most recent colonoscopy where she received propofol.    She has a history of redundant colon that requires deeper sedation            8/30/2024   Pre-Op Questionnaire   Have you ever had a heart attack or stroke? No   Have you ever had surgery on your heart or blood vessels, such as a stent placement, a coronary artery bypass, or surgery on an artery in your head, neck, heart, or legs? No   Do you have chest pain with activity? No   Do you have a history of heart failure? No   Do you currently have a cold, bronchitis or symptoms of other infection? No   Do you have a cough, shortness of breath, or wheezing? No   Do you or anyone in your family have previous history of blood clots? No   Do you or does anyone in your family have a serious bleeding problem such as prolonged bleeding following surgeries or cuts? No   Have you ever had problems with anemia or been told to take iron pills? No   Have you had any abnormal blood loss such as black, tarry or bloody stools, or abnormal vaginal bleeding? No   Have you ever had a blood transfusion? No   Are you willing to have a blood transfusion if it is medically needed before, during, or after your surgery? Yes   Have you or any of your relatives ever had problems with anesthesia? YES  nausea with Propofol   Do you have sleep apnea, excessive snoring or daytime drowsiness? No   Do you have any artifical heart valves or other implanted medical devices like a pacemaker, defibrillator, or continuous glucose monitor? No   Do you have artificial joints? (!) YES left total knee arthroplasty 2005   Are  you allergic to latex? No        Health Care Directive  Patient has a Health Care Directive on file      Preoperative Review of    reviewed - no record of controlled substances prescribed.        *  No recent infectious illnesses.    *  No recent cardiac or pulmonary issues or symptoms.    *  No problems performing vigorous physical activity, no changes in exercise tolerance.    *  No personal or family history of anesthesia complications.    *  No personal or family history of bleeding or clotting disorders.       Hypertension:  History of hypertension, on medication.  No reported side effects from medications.    Reviewed last 6 BP readings in chart:  BP Readings from Last 6 Encounters:   08/30/24 116/66   08/29/24 (!) 168/76   08/28/23 (!) 140/68   08/26/22 132/68   08/23/21 136/70   08/21/20 132/78     No active cardiac complaints or symptoms with exercise.       Hyperlipidemia:  Has history of hyperlipidemia.    The patient is taking a medication for this.  Denies any significant side effects from his medication.      Latest labs reviewed:    Recent Labs   Lab Test 08/25/23  1002 08/26/22  1142   CHOL 198 225*   HDL 76 92    113*   TRIG 108 102        Lab Results   Component Value Date    AST 21 08/29/2024    AST 21 08/24/2020         Patient Active Problem List    Diagnosis Date Noted    CMC arthritis 08/30/2023     Priority: Medium     Formatting of this note might be different from the original.   Added automatically from request for surgery [id number]      Female stress incontinence 08/23/2020     Priority: Medium    Multiple thyroid nodules 09/13/2019     Priority: Medium    Osteopenia, unspecified location 07/07/2018     Priority: Medium    Lung nodules 09/01/2016     Priority: Medium     tiny subpleural nodule in L lower lobe and 1-2 mm one in R upper lobe      Essential hypertension 07/06/2015     Priority: Medium    Hyperlipidemia with target LDL less than 130      Priority: Medium      Diagnosis updated by automated process. Provider to review and confirm.      Anxiety state 10/26/2004     Priority: Medium     Problem list name updated by automated process. Provider to review      Generalized osteoarthrosis, unspecified site 10/26/2004     Priority: Medium      Past Medical History:   Diagnosis Date    Anemia, unspecified     Anxiety state, unspecified     Basal cell carcinoma 9/08, 8/09    On back, excised     Depressive disorder     anxiety    Generalized osteoarthrosis, unspecified site     Hyperlipidemia LDL goal < 130     Lung nodules Sept 2016    tiny subpleural nodule in L lower lobe and 1-2 mm one in R upper lobe    Multiple thyroid nodules 9/13/2019    Osteopenia 12/08    hips    Other and unspecified anterior pituitary hyperfunction     History of hyperprolactinemia    Unspecified breast disorder 1997 and 1999    atypical ductal hyperplasia left breast    Unspecified essential hypertension      Past Surgical History:   Procedure Laterality Date    BREAST BIOPSY, CORE RT/LT  1997, 1999    2 biopsies, atypical ductal hyperplasia    COSMETIC SURGERY  facelift 2009    [facility] ENLARGE BREAST WITH IMPLANT      ORTHOPEDIC SURGERY  2005    partial L knee replacement    SALPINGO OOPHORECTOMY,R/L/DIMPLE  1970    right SO    ZZC APPENDECTOMY  1970    ZZHC COLONOSCOPY THRU STOMA, DIAGNOSTIC  4/16/09    [facility]     Current Outpatient Medications   Medication Sig Dispense Refill    FLUoxetine (PROZAC) 20 MG capsule Take 1 capsule (20 mg) by mouth daily 90 capsule 3    losartan-hydrochlorothiazide (HYZAAR) 100-12.5 MG tablet TAKE 1 TABLET BY MOUTH DAILY 90 tablet 3    pravastatin (PRAVACHOL) 40 MG tablet TAKE ONE TABLET BY MOUTH EVERY DAY 90 tablet 3    valACYclovir (VALTREX) 500 MG tablet Take 1 tablet (500 mg) by mouth 2 times daily for 3 days per painful skin rash flare 30 tablet 1    ondansetron (ZOFRAN ODT) 4 MG ODT tab Take 1 tablet (4 mg) by mouth every 6 hours as needed. (Patient not taking:  "Reported on 2024) 10 tablet 0       Allergies   Allergen Reactions    Lisinopril      cough    Propofol Nausea        Social History     Tobacco Use    Smoking status: Former     Current packs/day: 0.00     Average packs/day: 0.3 packs/day for 10.0 years (2.5 ttl pk-yrs)     Types: Cigarettes     Start date: 10/26/1959     Quit date: 10/26/1969     Years since quittin.8    Smokeless tobacco: Never    Tobacco comments:     light cigarette use   Substance Use Topics    Alcohol use: Yes     Alcohol/week: 2.0 standard drinks of alcohol     Types: 2 Glasses of wine per week     Comment: 2x week     Family History   Problem Relation Age of Onset    Cardiovascular Mother         dec age 86  AAA    Cancer Father         dec age 79 liver cancer    Cancer Brother         b   prostate cancer    Family History Negative Brother         b     Cancer - colorectal Father     Cancer - colorectal Mother     Hypertension Mother     Depression/Anxiety Father     Colon Cancer Mother     Colon Cancer Father     Prostate Cancer Brother     Depression Brother      History   Drug Use No             Review of Systems  Constitutional, neuro, ENT, endocrine, pulmonary, cardiac, gastrointestinal, genitourinary, musculoskeletal, integument and psychiatric systems are negative, except as otherwise noted.    Objective    /66   Pulse 68   Temp 98  F (36.7  C) (Oral)   Resp 16   Ht 1.6 m (5' 3\")   Wt 62.2 kg (137 lb 3.2 oz)   LMP  (LMP Unknown)   SpO2 97%   Breastfeeding No   BMI 24.30 kg/m     Estimated body mass index is 24.3 kg/m  as calculated from the following:    Height as of this encounter: 1.6 m (5' 3\").    Weight as of this encounter: 62.2 kg (137 lb 3.2 oz).  Physical Exam  GENERAL alert and no distress  EYES:  Normal sclera,conjunctiva, EOMI  HENT: oral and posterior pharynx without lesions or erythema, facies symmetric  NECK: Neck supple. No LAD, without thyroidmegaly.  RESP: Clear to ausculation " bilaterally without wheezes or crackles. Normal BS in all fields.  CV: RRR normal S1S2 without murmurs, rubs or gallops.  LYMPH: no cervical lymph adenopathy appreciated  MS: extremities- no gross deformities of the visible extremities noted,   EXT:  no lower extremity edema  PSYCH: Alert and oriented times 3; speech- coherent  SKIN:  No obvious significant skin lesions on visible portions of face     Recent Labs   Lab Test 08/29/24  1649   HGB 13.2         POTASSIUM 4.0   CR 0.70        Diagnostics  No labs were ordered during this visit.     EKG (8/30/2024): appears normal, NSR, normal axis, normal intervals, no acute ST/T changes c/w ischemia, no LVH by voltage criteria, unchanged from previous tracings    Revised Cardiac Risk Index (RCRI)  The patient has the following serious cardiovascular risks for perioperative complications:   - No serious cardiac risks = 0 points     RCRI Interpretation: 0 points: Class I (very low risk - 0.4% complication rate)         Signed Electronically by: Mani Miller MD  A copy of this evaluation report is provided to the requesting physician.

## 2024-08-30 NOTE — PATIENT INSTRUCTIONS
"      PRE-OPERATIVE INSTRUCTIONS:     Contact your surgeon if there is any change in your health. This includes signs of new infection, such as cold or flu (such as a sore throat, runny nose, cough, rash or fever).  Elective surgeries may need to be postponed if there is significant illness present.    Pre-procedure Covid testing is no longer required unless specifically requested by the surgeon or surgical facility.      Stop aspirin of any kind for 7 days before procedure.   (even low dose daily aspirin).    No NSAIDs (Motrin, Advil, ibuprofen, Aleve, etc) within 5-7 days of surgery.    Stop any Fish Oil or multi vitamin (and specifically anything containing vitamin E) supplements for 7 days prior to surgery because these can affect platelet function.      Tylenol (acetaminophen) is OK to take if needed, this medication has no effect on platelet function.  Follow instructions on the bottle    Follow any preparation instructions as given by the surgeons.  Prepare your body as instructed by the surgery clinic.  If instructed, Take a shower or bath the night before surgery. Use any special soaps or cleaning instructions according to instructions from your surgeon.  If you do not have soap from your surgeon, use your regular soap. Do not shave or scrub the surgery site.  Wear clean pajamas and have clean sheets on your bed     No solid food after midnight    ON THE MORNING OF SURGERY:     --Do NOT take Losartan HCTZ on the morning of the surgery.      -- OK to take any other morning medication if you wish.      Resume all medications at the same doses after surgery (no \"make up\" doses needed), unless instructed otherwise by the medical staff.     Follow all specific postoperative instructions (including any specific medications) as given by the surgeons    Attend all follow up appointments with the surgeons (and/or therapists if applicable) as instructed.     Contact the surgeon's office for any specific questions " about after-surgery cares and follow up instructions.      You do not need to necessarily return to see anyone in the primary care clinic after your surgery unless specifically instructed to do so.      If your planned surgery gets re-scheduled to a date that falls outside the 30 day window from the date of this pre-op exam, you do NOT need to return to see us for another pre-op exam.  Depending on the rescheduled date, we can probably still clear you for the surgery with this exam performed today, but I will have to write and addendum from the pre-op exam from today dated within the 30 days of the surgery date.   Please contact me with any changes in the date, time, and place of surgery.

## 2024-08-31 ENCOUNTER — MYC MEDICAL ADVICE (OUTPATIENT)
Dept: INTERNAL MEDICINE | Facility: CLINIC | Age: 74
End: 2024-08-31
Payer: COMMERCIAL

## 2024-09-03 ENCOUNTER — DOCUMENTATION ONLY (OUTPATIENT)
Dept: INTERNAL MEDICINE | Facility: CLINIC | Age: 74
End: 2024-09-03
Payer: COMMERCIAL

## 2024-09-03 DIAGNOSIS — F41.1 ANXIETY STATE: ICD-10-CM

## 2024-09-03 LAB — BACTERIA BLD CULT: NO GROWTH

## 2024-09-03 NOTE — PROGRESS NOTES
Oliva SUSANNA Kearney has an upcoming lab appointment:    Future Appointments   Date Time Provider Department Center   9/9/2024  8:15 AM [facility]   9/16/2024  3:00 PM Mitch Traylor MD [facility]   9/18/2024  3:50 PM [id number]     Patient is scheduled for the following lab(s):   Scheduling Notes: Pt requested labs for unpcoming annual visit. Annual visit is on 9/16. Would  like cholesterol tested as well. Aware of fasting requirements in case they need  to fast.   Made On:  Change Notes:  Change Notes:  Change Notes: 5/28/2024 10:56 AM  5/28/2024 10:57 AM  5/28/2024 11:00 AM  5/28/2024 11:02 AM By:  By:  By:  By: NAINA LEACH, NAINA LEACH, NAINA MCDONNELL       There is no order available. Please review and place either future orders or HMPO (Review of Health Maintenance Protocol Orders), as appropriate.    Health Maintenance Due   Topic    ANNUAL REVIEW OF HM ORDERS      If no labs are needed at this time please have the Care Team contact patient regarding this information and cancel lab appointment. Thank you.     Sunita MANUEL

## 2024-09-03 NOTE — PROGRESS NOTES
See response to  message 8/31/24. Lab appt not needed as labs recently drawn and pt notified via [employer]

## 2024-09-13 RX ORDER — ONDANSETRON 4 MG/1
4 TABLET, FILM COATED ORAL EVERY 8 HOURS PRN
COMMUNITY
Start: 2024-09-10 | End: 2024-09-26

## 2024-09-16 ENCOUNTER — OFFICE VISIT (OUTPATIENT)
Dept: INTERNAL MEDICINE | Facility: CLINIC | Age: 74
End: 2024-09-16
Payer: COMMERCIAL

## 2024-09-16 VITALS
OXYGEN SATURATION: 97 % | BODY MASS INDEX: 24.17 KG/M2 | DIASTOLIC BLOOD PRESSURE: 73 MMHG | HEIGHT: 63 IN | TEMPERATURE: 97.6 F | HEART RATE: 69 BPM | SYSTOLIC BLOOD PRESSURE: 138 MMHG | WEIGHT: 136.4 LBS

## 2024-09-16 DIAGNOSIS — E27.9 ADRENAL NODULE (H): ICD-10-CM

## 2024-09-16 DIAGNOSIS — Z00.00 MEDICARE ANNUAL WELLNESS VISIT, SUBSEQUENT: Primary | ICD-10-CM

## 2024-09-16 DIAGNOSIS — E04.2 MULTIPLE THYROID NODULES: ICD-10-CM

## 2024-09-16 DIAGNOSIS — B00.9 HERPES SIMPLEX INFECTION OF SKIN: ICD-10-CM

## 2024-09-16 DIAGNOSIS — F41.1 ANXIETY STATE: ICD-10-CM

## 2024-09-16 DIAGNOSIS — I10 ESSENTIAL HYPERTENSION: ICD-10-CM

## 2024-09-16 DIAGNOSIS — E78.5 HYPERLIPIDEMIA LDL GOAL <100: ICD-10-CM

## 2024-09-16 PROCEDURE — G0439 PPPS, SUBSEQ VISIT: HCPCS | Performed by: INTERNAL MEDICINE

## 2024-09-16 PROCEDURE — 99214 OFFICE O/P EST MOD 30 MIN: CPT | Mod: 25 | Performed by: INTERNAL MEDICINE

## 2024-09-16 RX ORDER — LOSARTAN POTASSIUM AND HYDROCHLOROTHIAZIDE 12.5; 1 MG/1; MG/1
1 TABLET ORAL DAILY
Qty: 90 TABLET | Refills: 3 | Status: SHIPPED | OUTPATIENT
Start: 2024-09-16

## 2024-09-16 RX ORDER — VALACYCLOVIR HYDROCHLORIDE 500 MG/1
500 TABLET, FILM COATED ORAL DAILY
Qty: 90 TABLET | Refills: 3 | Status: SHIPPED | OUTPATIENT
Start: 2024-09-16

## 2024-09-16 RX ORDER — VALACYCLOVIR HYDROCHLORIDE 500 MG/1
TABLET, FILM COATED ORAL
Qty: 30 TABLET | Refills: 1 | Status: CANCELLED | OUTPATIENT
Start: 2024-09-16

## 2024-09-16 RX ORDER — PRAVASTATIN SODIUM 40 MG
TABLET ORAL
Qty: 90 TABLET | Refills: 3 | Status: SHIPPED | OUTPATIENT
Start: 2024-09-16

## 2024-09-16 ASSESSMENT — ANXIETY QUESTIONNAIRES
IF YOU CHECKED OFF ANY PROBLEMS ON THIS QUESTIONNAIRE, HOW DIFFICULT HAVE THESE PROBLEMS MADE IT FOR YOU TO DO YOUR WORK, TAKE CARE OF THINGS AT HOME, OR GET ALONG WITH OTHER PEOPLE: NOT DIFFICULT AT ALL
GAD7 TOTAL SCORE: 0
6. BECOMING EASILY ANNOYED OR IRRITABLE: NOT AT ALL
5. BEING SO RESTLESS THAT IT IS HARD TO SIT STILL: NOT AT ALL
1. FEELING NERVOUS, ANXIOUS, OR ON EDGE: NOT AT ALL
2. NOT BEING ABLE TO STOP OR CONTROL WORRYING: NOT AT ALL
7. FEELING AFRAID AS IF SOMETHING AWFUL MIGHT HAPPEN: NOT AT ALL
3. WORRYING TOO MUCH ABOUT DIFFERENT THINGS: NOT AT ALL
GAD7 TOTAL SCORE: 0

## 2024-09-16 ASSESSMENT — PATIENT HEALTH QUESTIONNAIRE - PHQ9: 5. POOR APPETITE OR OVEREATING: NOT AT ALL

## 2024-09-16 NOTE — PATIENT INSTRUCTIONS
" Change Valcyclovir to 500mg tab,. 1 tab daily for  herpes flare prevention/prophylaxis   If recurrent flare despite this, then  let me know and will treat appropriately    Continue other medications  Prescriptions refilled at your pharmacy.    Call  [phone] or use [facility] to schedule a future lab appointment  fasting in 1-2 weeks.   For fasting labs, please refrain from eating for 8 hours or more.   Drink 2 glasses of water before your lab appointment. It is fine to take your  oral medications on the morning of the lab test as usual  Mammogram   as scheduled  CT abdomen with  adrenal nodule protocol. Lester Jean will call you to schedule. If you have not heard from their schedulers within 2 business days, then call [phone] (radiology scheduling)  I would recommend an updated covid vaccination and an influenza/flu vaccination in the Fall (October) 2024 at the clinic or any pharmacy  I would recommend  a  RSV vaccine (respiratory syncytial virus risk reduction) in September. Get at a pharmacy  Follow up in 1 year (Sept 2025 or later). Earlier as needed  Because non-acute appointments to see me in clinic are currently booking out about 3 months at the clinic (for multiple reasons), please use [facility] or call the appointment line now to schedule the future appointment so that it is \"on the books\".   Pt was informed regarding extra E&M billing for management of new or established medical issues not related to today's wellness/screening visit    "

## 2024-09-16 NOTE — PROGRESS NOTES
"Preventive Care Visit  [facility]  Mitch Traylor MD, Internal Medicine  Sep 16, 2024       ASSESSMENT:    1. Medicare annual wellness visit, subsequent  See plan discussion below regarding healthcare maintenance recommendations.  Otherwise up-to-date for age. Colonoscopy due again 2026. Mammogram scheduled to be done in 2 days    2. Herpes simplex infection of skin  Has previously been using as needed valacyclovir for flares but occurring frequent enough that patient now wishes to use daily suppressive therapy.  Currently asymptomatic  - valACYclovir (VALTREX) 500 MG tablet; Take 1 tablet (500 mg) by mouth daily.  Dispense: 90 tablet; Refill: 3    3. Hyperlipidemia with target LDL less than 100  Recent lipids above goal but patient states she was not fasting at that time.  Continue same statin dose for now and repeat fasting lab next 1 to 2 weeks  - pravastatin (PRAVACHOL) 40 MG tablet; TAKE ONE TABLET BY MOUTH EVERY DAY  Dispense: 90 tablet; Refill: 3  - Lipid panel reflex to direct LDL Fasting; Future    4. Essential hypertension  Controlled.  Continue current medication.  Recent renal function and electrolytes normal  - losartan-hydrochlorothiazide (HYZAAR) 100-12.5 MG tablet; Take 1 tablet by mouth daily.  Dispense: 90 tablet; Refill: 3    5. Anxiety state  Controlled. GAD7 = 0.  Continue current medication   - FLUoxetine (PROZAC) 20 MG capsule; Take 1 capsule (20 mg) by mouth daily.  Dispense: 90 capsule; Refill: 0    6. Adrenal nodule (H24)  Recent CT abdomen showed \"indeterminate left adrenal nodule measuring 1.5 cm. Follow-up routine/nonemergent adrenal protocol CT recommended.\"  Check cortisol level.  Dedicated CT abdomen for adrenal nodule protocol ordered as recommended by radiology  - CT Abdomen w/o Contrast; Future  - Cortisol; Future    7. Multiple thyroid nodules  Previous benign biopsy.  Recheck thyroid function.  If normal, will continue to monitor  - TSH with free T4 reflex; " Recommended artificial tears to use: 1 drop 4x a day in both eyes. "Future      PLAN:   Change Valcyclovir to 500mg tab,. 1 tab daily for  herpes flare prevention/prophylaxis   If recurrent flare despite this, then  let me know and will treat appropriately    Continue other medications  Prescriptions refilled at your pharmacy.    Call  [phone] or use [employer] to schedule a future lab appointment  fasting in 1-2 weeks.   For fasting labs, please refrain from eating for 8 hours or more.   Drink 2 glasses of water before your lab appointment. It is fine to take your  oral medications on the morning of the lab test as usual  Mammogram   as scheduled  CT abdomen with  adrenal nodule protocol. [facility] will call you to schedule. If you have not heard from their schedulers within 2 business days, then call [phone] (radiology scheduling)  I would recommend an updated covid vaccination and an influenza/flu vaccination in the Fall (October) 2024 at the clinic or any pharmacy  I would recommend  a  RSV vaccine (respiratory syncytial virus risk reduction) in September. Get at a pharmacy  Follow up in 1 year (Sept 2025 or later). Earlier as needed  Because non-acute appointments to see me in clinic are currently booking out about 3 months at the clinic (for multiple reasons), please use [employer] or call the appointment line now to schedule the future appointment so that it is \"on the books\".   Pt was informed regarding extra E&M billing for management of new or established medical issues not related to today's wellness/screening visit           Subjective   Lesley is a 74 year old, presenting for the following:  Wellness Visit  And follow-up regarding medical issues as above      Health Care Directive  Patient has a Health Care Directive on file  Advance care planning document is on file and is current.    HPI         9/14/2024   General Health   How would you rate your overall physical health? Excellent   Feel stress (tense, anxious, or unable to sleep) Only a little      (!) " STRESS CONCERN      9/14/2024   Nutrition   Diet: Regular (no restrictions)            9/14/2024   Exercise   Days per week of moderate/strenous exercise 4 days   Average minutes spent exercising at this level 40 min            9/14/2024   Social Factors   Frequency of gathering with friends or relatives More than three times a week   Worry food won't last until get money to buy more No   Food not last or not have enough money for food? No   Do you have housing? (Housing is defined as stable permanent housing and does not include staying ouside in a car, in a tent, in an abandoned building, in an overnight shelter, or couch-surfing.) Yes   Are you worried about losing your housing? No   Lack of transportation? No   Unable to get utilities (heat,electricity)? No            9/14/2024   Fall Risk   Fallen 2 or more times in the past year? No   Trouble with walking or balance? No             9/14/2024   Activities of Daily Living- Home Safety   Needs help with the following daily activites None of the above   Safety concerns in the home No grab bars in the bathroom            9/14/2024   Dental   Dentist two times every year? Yes            9/14/2024   Hearing Screening   Hearing concerns? None of the above            9/14/2024   Driving Risk Screening   Patient/family members have concerns about driving No            9/14/2024   General Alertness/Fatigue Screening   Have you been more tired than usual lately? No            9/14/2024   Urinary Incontinence Screening   Bothered by leaking urine in past 6 months Yes            9/14/2024   TB Screening   Were you born outside of the [place]? No            Today's PHQ-2 Score:       9/16/2024     2:29 PM   PHQ-2 ( 1999 [employer])   Q1: Little interest or pleasure in doing things 0   Q2: Feeling down, depressed or hopeless 0   PHQ-2 Score 0   Q1: Little interest or pleasure in doing things Not at all   Q2: Feeling down, depressed or hopeless Not at all   PHQ-2 Score 0            2024   Substance Use   Alcohol more than 3/day or more than 7/wk No   Do you have a current opioid prescription? No   How severe/bad is pain from 1 to 10? 0/10 (No Pain)   Do you use any other substances recreationally? No        Social History     Tobacco Use    Smoking status: Former     Current packs/day: 0.00     Average packs/day: 0.3 packs/day for 10.0 years (2.5 ttl pk-yrs)     Types: Cigarettes     Start date: 10/26/1959     Quit date: 10/26/1969     Years since quittin.9    Smokeless tobacco: Never    Tobacco comments:     light cigarette use   Substance Use Topics    Alcohol use: Yes     Alcohol/week: 2.0 standard drinks of alcohol     Types: 2 Glasses of wine per week     Comment: 1 glass wine per day max    Drug use: No            2023   LAST FHS-7 RESULTS   1st degree relative breast or ovarian cancer No   Any relative bilateral breast cancer No   Any male have breast cancer No   Any ONE woman have BOTH breast AND ovarian cancer No   Any woman with breast cancer before 50yrs No   2 or more relatives with breast AND/OR ovarian cancer No         Mammogram Screening - After age 74- determine frequency with patient based on health status, life expectancy and patient goals    ASCVD Risk   The 10-year ASCVD risk score (Juany DK, et al., 2019) is: 24.4%    Values used to calculate the score:      Age: 74 years      Sex: Female      Is Non- : No      Diabetic: No      Tobacco smoker: No      Systolic Blood Pressure: 138 mmHg      Is BP treated: Yes      HDL Cholesterol: 75 mg/dL      Total Cholesterol: 220 mg/dL     DEXA 2023. Repeat 3 years (previous Boniva treatment)       Current providers sharing in care for this patient include:  Patient Care Team:  Mitch Traylor MD as PCP - General  Mitch Traylor MD as Assigned PCP    The following health maintenance items are reviewed in Epic and correct as of today:  Health Maintenance   Topic Date Due    RSV VACCINE  (1 - 1-dose 75+ series) Never done    ANNUAL REVIEW OF HM ORDERS  08/28/2024    INFLUENZA VACCINE (1) 09/01/2024    COVID-19 Vaccine (8 - 2024-25 season) 09/01/2024    MEDICARE ANNUAL WELLNESS VISIT  08/28/2024    COLORECTAL CANCER SCREENING  12/13/2024    ALT  08/29/2025    BMP  08/29/2025    LIPID  08/29/2025    FALL RISK ASSESSMENT  09/16/2025    DTAP/TDAP/TD IMMUNIZATION (5 - Td or Tdap) 05/11/2026    DEXA  06/12/2026    GLUCOSE  08/29/2027    ADVANCE CARE PLANNING  08/29/2028    HEPATITIS C SCREENING  Completed    PHQ-2 (once per calendar year)  Completed    Pneumococcal Vaccine: 65+ Years  Completed    ZOSTER IMMUNIZATION  Completed    HPV IMMUNIZATION  Aged Out    MENINGITIS IMMUNIZATION  Aged Out    RSV MONOCLONAL ANTIBODY  Aged Out    MAMMO SCREENING  Discontinued    LUNG CANCER SCREENING  Discontinued       DEXA June 2023. Repeat 3 years (previous Boniva treatment)         Pt's past medical history, family history, habits, medications and allergies were reviewed with the patient today.   Most recent lab results reviewed with pt. Problem list and histories reviewed & adjusted, as indicated.       Review of Systems  CONSTITUTIONAL: NEGATIVE for fever, chills, change in weight  INTEGUMENTARY/SKIN: NEGATIVE for current worrisome rashes, moles. Has  Intermittent herpes flares in genital, buttock areas .  Wishes to start  suppressive therapy with  hx more frequent episodes  EYES: NEGATIVE for vision changes or irritation  ENT/MOUTH: NEGATIVE for ear, mouth and throat problems  RESP: NEGATIVE for significant cough or SOB  BREAST: NEGATIVE for masses, tenderness or discharge   Mammogram scheduled  CV: NEGATIVE for chest pain, palpitations or peripheral edema  GI: NEGATIVE for nausea, abdominal pain, heartburn, or change in bowel habits with fiber gummies. Had repeat colonoscopy last week thjat was negative  : NEGATIVE for frequency, dysuria, or hematuria  MUSCULOSKELETAL: NEGATIVE for significant arthralgias or  "myalgia except for stable osteoarthritis  hands  NEURO: NEGATIVE for weakness, dizziness or paresthesias  ENDOCRINE: NEGATIVE for temperature intolerance. On statin and needs repeat lipids fasting  HEME: NEGATIVE for bleeding problems  PSYCHIATRIC: NEGATIVE for changes in mood or affect with meds. PHQ9 = 0     Objective    Exam  /73   Pulse 69   Temp 97.6  F (36.4  C) (Temporal)   Ht 1.6 m (5' 3\")   Wt 61.9 kg (136 lb 6.4 oz)   LMP  (LMP Unknown)   SpO2 97%   BMI 24.16 kg/m     Estimated body mass index is 24.16 kg/m  as calculated from the following:    Height as of this encounter: 1.6 m (5' 3\").    Weight as of this encounter: 61.9 kg (136 lb 6.4 oz).    Physical Exam  General appearance - healthy, alert, no distress  Skin - No current rashes or lesions.  Head - normocephalic, atraumatic  Eyes - MANDO, EOMI, fundi exam with nondilated pupils negative.  Ears - External ears normal. Canals clear. TM's normal.  Nose/Sinuses - Nares normal. Septum midline. Mucosa normal. No drainage or sinus tenderness.  Oropharynx - No erythema, no adenopathy, no exudates.  Neck - Supple without adenopathy. No bruits.  Single approx 3cm palpable nodulesright upper and lower lobes  Lungs - Clear to auscultation without wheezes/rhonchi.  Heart - Regular rate and rhythm without murmurs, clicks, or gallops.  Nodes - No supraclavicular, axillary, or inguinal adenopathy palpable.  Breasts - deferred  Abdomen - Abdomen soft, non-tender. BS normal. No masses or hepatosplenomegaly palpable. No bruits.  Extremities -No cyanosis, clubbing or edema.    Musculoskeletal - Spine ROM normal. Muscular strength intact.  DIP joints hands with osteoarthritis thickening. No erythema  Peripheral pulses - radial=4/4, femoral=4/4, posterior tibial=4/4, dorsalis pedis=4/4,  Neuro - Gait normal. Reflexes normal and symmetric. Sensation grossly WNL.  Genital/Rectal - deferred          9/16/2024   Mini Cog   Clock Draw Score 2 Normal   3 Item " Recall 3 objects recalled   Mini Cog Total Score 5           Signed Electronically by: Mitch Traylor MD

## 2024-09-18 ENCOUNTER — HOSPITAL ENCOUNTER (OUTPATIENT)
Dept: MAMMOGRAPHY | Facility: CLINIC | Age: 74
Discharge: HOME | End: 2024-09-18
Attending: INTERNAL MEDICINE | Admitting: INTERNAL MEDICINE
Payer: COMMERCIAL

## 2024-09-18 DIAGNOSIS — Z12.31 ENCOUNTER FOR SCREENING MAMMOGRAM FOR BREAST CANCER: ICD-10-CM

## 2024-09-18 PROCEDURE — 77067 SCR MAMMO BI INCL CAD: CPT

## 2024-09-26 ENCOUNTER — OFFICE VISIT (OUTPATIENT)
Dept: OBGYN | Facility: CLINIC | Age: 74
End: 2024-09-26
Payer: COMMERCIAL

## 2024-09-26 ENCOUNTER — MYC MEDICAL ADVICE (OUTPATIENT)
Dept: INTERNAL MEDICINE | Facility: CLINIC | Age: 74
End: 2024-09-26

## 2024-09-26 VITALS
DIASTOLIC BLOOD PRESSURE: 52 MMHG | HEIGHT: 63 IN | WEIGHT: 137 LBS | SYSTOLIC BLOOD PRESSURE: 100 MMHG | BODY MASS INDEX: 24.27 KG/M2

## 2024-09-26 DIAGNOSIS — N90.7 INCLUSION CYST OF VULVA: Primary | ICD-10-CM

## 2024-09-26 PROCEDURE — 99203 OFFICE O/P NEW LOW 30 MIN: CPT

## 2024-09-26 PROCEDURE — 99459 PELVIC EXAMINATION: CPT

## 2024-09-26 ASSESSMENT — ANXIETY QUESTIONNAIRES
GAD7 TOTAL SCORE: 0
7. FEELING AFRAID AS IF SOMETHING AWFUL MIGHT HAPPEN: NOT AT ALL
GAD7 TOTAL SCORE: 0
GAD7 TOTAL SCORE: 0

## 2024-09-26 ASSESSMENT — PATIENT HEALTH QUESTIONNAIRE - PHQ9
SUM OF ALL RESPONSES TO PHQ QUESTIONS 1-9: 0
SUM OF ALL RESPONSES TO PHQ QUESTIONS 1-9: 0
10. IF YOU CHECKED OFF ANY PROBLEMS, HOW DIFFICULT HAVE THESE PROBLEMS MADE IT FOR YOU TO DO YOUR WORK, TAKE CARE OF THINGS AT HOME, OR GET ALONG WITH OTHER PEOPLE: NOT DIFFICULT AT ALL

## 2024-09-26 NOTE — PROGRESS NOTES
SUBJECTIVE:                                                   Oliva Kearney is a 74 year old female who presents to clinic today for the following health issue(s):  Patient presents with:  Vaginal Problem: Vaginal bumps for 2 weeks on the left labia.      HPI:  Lesley notice some hard lumps n the right side of her vulva for the past 2 weeks. Denies pain, burning, itching, discharge, bleeding. Denies spreading or feeling ill.    No LMP recorded (lmp unknown). Patient is postmenopausal..     Patient is sexually active, .  Using menopause for contraception.    reports that she quit smoking about 54 years ago. Her smoking use included cigarettes. She started smoking about 64 years ago. She has a 2.5 pack-year smoking history. She has never used smokeless tobacco.    STD testing offered?  Declined    Health maintenance updated:  yes     Overdue          Never done RSV VACCINE (1 - 1-dose 75+ series)     AUG 28  2024 MEDICARE ANNUAL WELLNESS VISIT (Yearly)  Last completed: Aug 28, 2023     SEP 1  2024 INFLUENZA VACCINE (1)  Last completed: Oct 9, 2023     SEP 1  2024 COVID-19 Vaccine ( season)  Last completed: Oct 9, 2023       Today's PHQ-2 Score:       2024     2:29 PM   PHQ-2 (  [employer])   Q1: Little interest or pleasure in doing things 0   Q2: Feeling down, depressed or hopeless 0   PHQ-2 Score 0   Q1: Little interest or pleasure in doing things Not at all   Q2: Feeling down, depressed or hopeless Not at all   PHQ-2 Score 0     Today's PHQ-9 Score:       2024     1:52 PM   PHQ-9 SCORE   PHQ-9 Total Score MyChart 0   PHQ-9 Total Score 0     Today's MARCOS-7 Score:       2024     1:53 PM   MARCOS-7 SCORE   Total Score 0 (minimal anxiety)   Total Score 0   Answers submitted by the patient for this visit:  Patient Health Questionnaire (Submitted on 2024)  If you checked off any problems, how difficult have these problems made it for you to do your work, take care of things at  home, or get along with other people?: Not difficult at all  PHQ9 TOTAL SCORE: 0  Patient Health Questionnaire (G7) (Submitted on 2024)  MARCOS 7 TOTAL SCORE: 0    Problem list and histories reviewed & adjusted, as indicated.  Additional history: as documented.    Patient Active Problem List   Diagnosis    Anxiety state    Generalized osteoarthrosis, unspecified site    Essential hypertension    Lung nodules    Osteopenia, unspecified location    Multiple thyroid nodules    Female stress incontinence    CMC arthritis    Herpes simplex infection of skin    Hyperlipidemia LDL goal <100    Adrenal nodule (H)     Past Surgical History:   Procedure Laterality Date    BREAST BIOPSY, CORE RT/LT  ,     2 biopsies, atypical ductal hyperplasia    COSMETIC SURGERY  facelift 2009    HC ENLARGE BREAST WITH IMPLANT      ORTHOPEDIC SURGERY  2005    partial L knee replacement    SALPINGO OOPHORECTOMY,R/L/DIMPLE  1970    right SO    ZZC APPENDECTOMY  1970    ZZHC COLONOSCOPY THRU STOMA, DIAGNOSTIC  09    [facility]      Social History     Tobacco Use    Smoking status: Former     Current packs/day: 0.00     Average packs/day: 0.3 packs/day for 10.0 years (2.5 ttl pk-yrs)     Types: Cigarettes     Start date: 10/26/1959     Quit date: 10/26/1969     Years since quittin.9    Smokeless tobacco: Never    Tobacco comments:     light cigarette use   Substance Use Topics    Alcohol use: Yes     Alcohol/week: 2.0 standard drinks of alcohol     Types: 2 Glasses of wine per week     Comment: 1 glass wine per day max      Problem (# of Occurrences) Relation (Name,Age of Onset)    Anxiety Disorder (1) Brother (Van Sampson)    Cancer (2) Father (Masood Sampson): dec age 79 liver cancer, Brother: b 1940  prostate cancer    Cardiovascular (1) Mother (Payam Sampson): dec age 86  AAA    Depression (3) Father (Masood Sampson), Brother, Brother (Van Sampson)    Hypertension (1) Mother (Payam Sampson)    Family History Negative (1) Brother: b  "1951    Cancer - colorectal (2) Mother (Payam Sampson), Father (Masood Sampson)    Prostate Cancer (2) Brother, Brother (Shawn Sampson)    Depression/Anxiety (1) Father (Masood Sampson)    Colon Cancer (2) Mother (Payam Sampson), Father (Masood Sampson)              Current Outpatient Medications   Medication Sig Dispense Refill    FLUoxetine (PROZAC) 20 MG capsule Take 1 capsule (20 mg) by mouth daily. 90 capsule 0    losartan-hydrochlorothiazide (HYZAAR) 100-12.5 MG tablet Take 1 tablet by mouth daily. 90 tablet 3    pravastatin (PRAVACHOL) 40 MG tablet TAKE ONE TABLET BY MOUTH EVERY DAY 90 tablet 3    valACYclovir (VALTREX) 500 MG tablet Take 1 tablet (500 mg) by mouth daily. 90 tablet 3     No current facility-administered medications for this visit.     Allergies   Allergen Reactions    Lisinopril      cough    Propofol Nausea         OBJECTIVE:     /52 (BP Location: Right arm)   Ht 1.6 m (5' 3\")   Wt 62.1 kg (137 lb)   LMP  (LMP Unknown)   BMI 24.27 kg/m    Body mass index is 24.27 kg/m .    Exam:  Constitutional:  Appearance: Well nourished, well developed alert, in no acute distress  Psychiatric:  Mentation appears normal and affect normal/bright.  Pelvic Exam:  External Genitalia:     Normal appearance for age, no discharge present, no tenderness present  Small inclusion cysts on right labia majora  Vagina:     Normal vaginal vault without central or paravaginal defects, no discharge present, no inflammatory lesions present, no masses present  Bladder:     Nontender to palpation  Urethra:   Urethral Body:  Urethra palpation normal, urethra structural support normal    Perineum:     Perineum within normal limits, no evidence of trauma, no rashes or skin lesions present  Anus:     Anus within normal limits, no hemorrhoids present  Inguinal Lymph Nodes:     No lymphadenopathy present  Pubic Hair:     Normal pubic hair distribution for age  Genitalia and Groin:     No rashes present, no lesions present, no areas of " discoloration, no masses present         In-Clinic Test Results:  No results found for this or any previous visit (from the past 24 hour(s)).    ASSESSMENT/PLAN:                                                        ICD-10-CM    1. Inclusion cyst of vulva  N90.7           -discussed drainage vs observation  -patient would like to observe for now, may return at later date if she could like removal  -discussed vulvar hygiene   -recommend warm compress, sitz bathes, epsom salt bathes    LESTER Jones [facility] [place]

## 2024-09-28 PROBLEM — E27.9 ADRENAL NODULE (H): Status: ACTIVE | Noted: 2024-09-28

## 2024-09-28 PROBLEM — E78.5 HYPERLIPIDEMIA LDL GOAL <100: Status: ACTIVE | Noted: 2024-09-28

## 2024-09-28 PROBLEM — B00.9 HERPES SIMPLEX INFECTION OF SKIN: Status: ACTIVE | Noted: 2024-09-28

## 2025-08-18 ENCOUNTER — PATIENT OUTREACH (OUTPATIENT)
Dept: CARE COORDINATION | Facility: CLINIC | Age: 75
End: 2025-08-18
Payer: COMMERCIAL